# Patient Record
Sex: FEMALE | Race: WHITE | HISPANIC OR LATINO | Employment: UNEMPLOYED | URBAN - METROPOLITAN AREA
[De-identification: names, ages, dates, MRNs, and addresses within clinical notes are randomized per-mention and may not be internally consistent; named-entity substitution may affect disease eponyms.]

---

## 2018-05-01 ENCOUNTER — APPOINTMENT (OUTPATIENT)
Dept: RADIOLOGY | Facility: MEDICAL CENTER | Age: 53
End: 2018-05-01
Attending: EMERGENCY MEDICINE
Payer: MEDICAID

## 2018-05-01 ENCOUNTER — HOSPITAL ENCOUNTER (EMERGENCY)
Facility: MEDICAL CENTER | Age: 53
End: 2018-05-01
Attending: EMERGENCY MEDICINE
Payer: MEDICAID

## 2018-05-01 VITALS
HEIGHT: 60 IN | RESPIRATION RATE: 16 BRPM | TEMPERATURE: 99.6 F | HEART RATE: 80 BPM | BODY MASS INDEX: 31.12 KG/M2 | OXYGEN SATURATION: 94 % | SYSTOLIC BLOOD PRESSURE: 153 MMHG | DIASTOLIC BLOOD PRESSURE: 77 MMHG | WEIGHT: 158.51 LBS

## 2018-05-01 DIAGNOSIS — J10.1 INFLUENZA B: ICD-10-CM

## 2018-05-01 LAB
FLUAV RNA SPEC QL NAA+PROBE: NEGATIVE
FLUBV RNA SPEC QL NAA+PROBE: POSITIVE

## 2018-05-01 PROCEDURE — 700102 HCHG RX REV CODE 250 W/ 637 OVERRIDE(OP): Performed by: EMERGENCY MEDICINE

## 2018-05-01 PROCEDURE — 70450 CT HEAD/BRAIN W/O DYE: CPT

## 2018-05-01 PROCEDURE — 87502 INFLUENZA DNA AMP PROBE: CPT

## 2018-05-01 PROCEDURE — A9270 NON-COVERED ITEM OR SERVICE: HCPCS | Performed by: EMERGENCY MEDICINE

## 2018-05-01 PROCEDURE — 99283 EMERGENCY DEPT VISIT LOW MDM: CPT

## 2018-05-01 RX ORDER — HYDROCODONE BITARTRATE AND ACETAMINOPHEN 5; 325 MG/1; MG/1
2 TABLET ORAL ONCE
Status: COMPLETED | OUTPATIENT
Start: 2018-05-01 | End: 2018-05-01

## 2018-05-01 RX ADMIN — HYDROCODONE BITARTRATE AND ACETAMINOPHEN 2 TABLET: 5; 325 TABLET ORAL at 13:38

## 2018-05-01 ASSESSMENT — PAIN SCALES - GENERAL: PAINLEVEL_OUTOF10: 8

## 2018-05-01 NOTE — DISCHARGE INSTRUCTIONS
Gripe en el adulto  (Influenza, Adult)    Jomar ibuprofen 600-800 mg cada 6 horas 2-3 rodriguez y la medicina de la receta contra dolor y tos.  Descansa, jomar muchos liquidos y no trabaja o vaya a la escuela hasta no tiene fiebre 24 horas (sin ayuda de ibuprofen y tylenol).  Regresa para dificultad de respirar o si parece enfermo.  Vaya a [facility] si no mejora despues de 6 rodriguez enfermedad.    Posiblamiente usted tiene yamilet presion de asif.  Vaya a un medico propio para chequear la presion y el nivel de cholesterol.  BP Readings from Last 3 Encounters:   05/01/18 153/77         Le quezada diagnosticado gripe. La gripe es yue infección viral del tracto respiratorio. Causa escalofríos, fiebre, tos seca, cefaleas, carlitos corporales y dolor de garganta. La gripe lo hará sentir más enfermo que cuando tiene un resfrío común. El peor malestar de la enfermedad dura unos pocos días. La tos y el cansancio pueden durar otros 7 a 10 días. La gripe es muy contagiosa. Se disemina fácilmente a través de las gotas de la tos y el estornudo. Leobardo se trata de un virus, no responderá adecuadamente a los antibióticos (medicamentos que destruyen a las bacterias o gérmenes).    INSTRUCCIONES para el cuidado domiciliario  Ø NO ADMINISTRE ASPIRINA A MENORES DE 18 AÑOS QUE PADEZCAN GRIPE. Puede provocar un daño en el cerebro y en el hígado si produce el síndrome de Reye. Fabienne las etiquetas de los medicamentos de venta sin receta antes de utilizarlos.  Ø Puede adriano Tylenol® o Advil (Motrin)® según lo necesite para los carlitos, el malestar y la temperatura elevada.  Ø Utilice un humidificador de edda fría para aumentar la humedad del ambiente.  Raysal le permitirá respirar mejor.   Ø Sylwia reposo hasta que la temperatura sea normal: 98.6° F (37.0° C). Generalmente esto lleva entre 3 y 4 días. Descanse lo suficiente.  Ø Sharon al menos 8 vasos de 250 cc de líquido por día, leobardo agua, jugo, caldo, gelatina o limonada.   Ø Lávese las trudi con frecuencia  para evitar la diseminación del virus. Greasy es especialmente importante después de sonarse la nariz y antes de tocar los alimentos.     SOLICITE ATENCIÓN MÉDICA SI:  Ø La temperatura oral se eleva sin motivo por encima de 102°F (38.9°C) o según le indique el profesional que lo asiste.  Ø Si la fiebre dura más de 3 días.  Ø Presenta dificultad respiratoria patel el reposo.  Ø Tiene mucha tos con producción de moco o siente dolor en el pecho.  Ø Si tiene náuseas (ganas de vomitar), vómitos o diarrea.    SOLICITE ATENCIÓN MÉDICA DE INMEDIATO SI:  Ø Si tiene dificultad para respirar, le falta la respiración o la piel o las uñas se tornan azulados.  Ø Presenta dolor o rigidez en el ludivina.   Ø Comienza a sentir un dolor de laura intenso, dolor en el albina o en los oídos.  Ø Aparece fiebre elevada, que no puede controlar con medicamentos, o que dura más de 3 días.  Ø Presenta náuseas o vómitos que no puede controlar.    Document Released: 09/27/2006  Document Re-Released: 06/18/2007  [employer]® Patient Information ©2008 [employer].

## 2018-05-01 NOTE — ED PROVIDER NOTES
"ED Provider Note    CHIEF COMPLAINT  Chief Complaint   Patient presents with   • Headache     pt reports \"not feeling well\" since Sunday.  Pt reports chills, hot flashes, headache, and body aches.  Pt denies fever, runny nose.   • Cough   • Chills       HPI  Martha Reyes-Martinez is a 53 y.o. female who presents for moderately severe headache onset yesterday. She also has bilateral thigh and leg pain seems to be more muscles and joints. Her headache is bitemporal and is severe as 8 of 10. It is constant. She had subjective fever. No photophobia. Rare headache and no history of migraine. No family history of aneurysm. She has mild cough and generalized but no focal weakness.    REVIEW OF SYSTEMS  Pertinent positives include: Nausea, headache.  Pertinent negatives include: No matting, photophobia, rhinorrhea, sore throat, diarrhea, rash, diabetes.  10+ systems reviewed and negative.      PAST MEDICAL HISTORY  Hypertension    FAMILY HISTORY  No aneurysm history    SOCIAL HISTORY  Here with daughter      CURRENT MEDICATIONS  Hydrochlorothiazide    ALLERGIES  No Known Allergies    PHYSICAL EXAM  VITAL SIGNS: /77   Pulse 80   Temp 37.6 °C (99.6 °F)   Resp 16   Ht 1.524 m (5')   Wt 71.9 kg (158 lb 8.2 oz)   SpO2 94%   BMI 30.96 kg/m²   Reviewed and hypertensive  Constitutional: Well developed, Well nourished, well-appearing.  HENT: Normocephalic, atraumatic, bilateral external ears normal, oropharynx moist, No exudates or erythema. TMJ crepitus bilaterally. No tooth tenderness. Face symmetric.  Eyes: PERRLA, conjunctiva pink, no scleral icterus.   Cardiovascular: Regular S1-S2 without murmur, rub, gallop. No Dependent edema  Respiratory: No rales, rhonchi, wheeze.  Gastrointestinal: Soft, nontender, nondistended.  Skin: No erythema, no rash.   Genitourinary:  No costovertebral angle tenderness.   Neurologic: Alert & oriented x 3, Cranial nerves II-XII are intact, Grasp, biceps, extensor hallucis longus, ankle " plantar flexion are 5/5 and symmetric, Finger nose finger and fine motor normal. Sensation is intact to light touch in all 4 limbs.  No focal deficits noted.  Psychiatric: Affect normal, Judgment normal, Mood normal.     DIFFERENTIAL DIAGNOSIS:  Subarachnoid hemorrhage, doubt hypertensive headache, influenza, doubt rhabdomyolysis, doubt polymyositis or other viral syndrome.    RADIOLOGY/PROCEDURES  CT-HEAD W/O   Final Result      No acute intracranial abnormality is identified.          LABORATORY:  Results for orders placed or performed during the hospital encounter of 05/01/18   INFLUENZA A/B BY PCR   Result Value Ref Range    Influenza virus A RNA Negative Negative    Influenza virus B, PCR POSITIVE (A) Negative       INTERVENTIONS:  Medications   HYDROcodone-acetaminophen (NORCO) 5-325 MG per tablet 2 Tab (2 Tabs Oral Given 5/1/18 9186)       COURSE & MEDICAL DECISION MAKING  Well-appearing patient presents with headache but minimal cough and no fever and surprisingly has acute influenza B. There is no evidence of subarachnoid hemorrhage. Patient is low risk for serious infection so Tamiflu is not indicated.    PLAN:    Ibuprofen and Tylenol  Influenza handout handout given  Rest and fluids  Return for ill appearance, shortness of breath    MYCHAL Collier  [address] [place] [id number]  [phone]    Schedule an appointment as soon as possible for a visit  si no mejora 5 rodriguez      CONDITION: Stable.    FINAL IMPRESSION  1. Influenza B          Electronically signed by: Toño Aldridge, 5/1/2018 1:27 PM

## 2018-05-01 NOTE — ED TRIAGE NOTES
"Martha Reyes-Martinez 53 y.o. female ambulatory to triage with family for     Chief Complaint   Patient presents with   • Headache     pt reports \"not feeling well\" since Sunday.  Pt reports chills, hot flashes, headache, and body aches.  Pt denies fever, runny nose.   • Cough   • Chills     Pt Jamaican speaking, requesting daughter to translate and declined offer for  ipad.  Pt in NAD.  /77   Pulse 80   Temp 37.6 °C (99.6 °F)   Resp 16   Ht 1.524 m (5')   Wt 71.9 kg (158 lb 8.2 oz)   SpO2 94%   BMI 30.96 kg/m²     "

## 2018-08-25 ENCOUNTER — HOSPITAL ENCOUNTER (INPATIENT)
Facility: MEDICAL CENTER | Age: 53
LOS: 3 days | DRG: 871 | End: 2018-08-29
Attending: EMERGENCY MEDICINE | Admitting: INTERNAL MEDICINE
Payer: MEDICAID

## 2018-08-25 DIAGNOSIS — N12 PYELONEPHRITIS: ICD-10-CM

## 2018-08-25 DIAGNOSIS — S37.019A PERINEPHRIC HEMATOMA: ICD-10-CM

## 2018-08-25 PROCEDURE — 80053 COMPREHEN METABOLIC PANEL: CPT

## 2018-08-25 PROCEDURE — 85007 BL SMEAR W/DIFF WBC COUNT: CPT

## 2018-08-25 PROCEDURE — 85610 PROTHROMBIN TIME: CPT

## 2018-08-25 PROCEDURE — 36415 COLL VENOUS BLD VENIPUNCTURE: CPT

## 2018-08-25 PROCEDURE — 83690 ASSAY OF LIPASE: CPT

## 2018-08-25 PROCEDURE — 85027 COMPLETE CBC AUTOMATED: CPT

## 2018-08-25 PROCEDURE — 99285 EMERGENCY DEPT VISIT HI MDM: CPT

## 2018-08-25 RX ORDER — ONDANSETRON 2 MG/ML
4 INJECTION INTRAMUSCULAR; INTRAVENOUS ONCE
Status: COMPLETED | OUTPATIENT
Start: 2018-08-26 | End: 2018-08-26

## 2018-08-25 RX ORDER — KETOROLAC TROMETHAMINE 30 MG/ML
15 INJECTION, SOLUTION INTRAMUSCULAR; INTRAVENOUS ONCE
Status: COMPLETED | OUTPATIENT
Start: 2018-08-26 | End: 2018-08-26

## 2018-08-25 ASSESSMENT — PAIN SCALES - GENERAL: PAINLEVEL_OUTOF10: 1

## 2018-08-26 ENCOUNTER — APPOINTMENT (OUTPATIENT)
Dept: RADIOLOGY | Facility: MEDICAL CENTER | Age: 53
DRG: 871 | End: 2018-08-26
Attending: EMERGENCY MEDICINE
Payer: MEDICAID

## 2018-08-26 ENCOUNTER — APPOINTMENT (OUTPATIENT)
Dept: RADIOLOGY | Facility: MEDICAL CENTER | Age: 53
DRG: 871 | End: 2018-08-26
Attending: NURSE PRACTITIONER
Payer: MEDICAID

## 2018-08-26 PROBLEM — D64.9 ANEMIA: Status: ACTIVE | Noted: 2018-08-26

## 2018-08-26 PROBLEM — K68.3 RETROPERITONEAL HEMATOMA: Status: ACTIVE | Noted: 2018-08-26

## 2018-08-26 PROBLEM — I10 HYPERTENSION: Status: ACTIVE | Noted: 2018-08-26

## 2018-08-26 PROBLEM — N12 PYELONEPHRITIS: Status: ACTIVE | Noted: 2018-08-26

## 2018-08-26 PROBLEM — A41.9 SEPSIS (HCC): Status: ACTIVE | Noted: 2018-08-26

## 2018-08-26 LAB
ABO GROUP BLD: NORMAL
ABO GROUP BLD: NORMAL
ALBUMIN SERPL BCP-MCNC: 3.1 G/DL (ref 3.2–4.9)
ALBUMIN/GLOB SERPL: 1 G/DL
ALP SERPL-CCNC: 71 U/L (ref 30–99)
ALT SERPL-CCNC: 34 U/L (ref 2–50)
ANION GAP SERPL CALC-SCNC: 14 MMOL/L (ref 0–11.9)
ANISOCYTOSIS BLD QL SMEAR: ABNORMAL
APPEARANCE UR: ABNORMAL
APTT PPP: 37.9 SEC (ref 24.7–36)
AST SERPL-CCNC: 33 U/L (ref 12–45)
BACTERIA #/AREA URNS HPF: ABNORMAL /HPF
BASOPHILS # BLD AUTO: 0 % (ref 0–1.8)
BASOPHILS # BLD: 0 K/UL (ref 0–0.12)
BILIRUB SERPL-MCNC: 0.3 MG/DL (ref 0.1–1.5)
BILIRUB UR QL STRIP.AUTO: NEGATIVE
BLD GP AB SCN SERPL QL: NORMAL
BUN SERPL-MCNC: 19 MG/DL (ref 8–22)
CALCIUM SERPL-MCNC: 8.7 MG/DL (ref 8.5–10.5)
CHLORIDE SERPL-SCNC: 110 MMOL/L (ref 96–112)
CO2 SERPL-SCNC: 21 MMOL/L (ref 20–33)
COLOR UR: YELLOW
CREAT SERPL-MCNC: 1.09 MG/DL (ref 0.5–1.4)
EOSINOPHIL # BLD AUTO: 0.12 K/UL (ref 0–0.51)
EOSINOPHIL NFR BLD: 0.9 % (ref 0–6.9)
ERYTHROCYTE [DISTWIDTH] IN BLOOD BY AUTOMATED COUNT: 41.2 FL (ref 35.9–50)
ERYTHROCYTE [DISTWIDTH] IN BLOOD BY AUTOMATED COUNT: 41.4 FL (ref 35.9–50)
GLOBULIN SER CALC-MCNC: 3.1 G/DL (ref 1.9–3.5)
GLUCOSE SERPL-MCNC: 200 MG/DL (ref 65–99)
GLUCOSE UR STRIP.AUTO-MCNC: NEGATIVE MG/DL
HCT VFR BLD AUTO: 24 % (ref 37–47)
HCT VFR BLD AUTO: 26 % (ref 37–47)
HCT VFR BLD AUTO: 27 % (ref 37–47)
HCT VFR BLD AUTO: 31.3 % (ref 37–47)
HGB BLD-MCNC: 10.5 G/DL (ref 12–16)
HGB BLD-MCNC: 7.8 G/DL (ref 12–16)
HGB BLD-MCNC: 8.4 G/DL (ref 12–16)
HGB BLD-MCNC: 8.8 G/DL (ref 12–16)
INR PPP: 1.16 (ref 0.87–1.13)
KETONES UR STRIP.AUTO-MCNC: NEGATIVE MG/DL
LACTATE BLD-SCNC: 1.9 MMOL/L (ref 0.5–2)
LACTATE BLD-SCNC: 2.6 MMOL/L (ref 0.5–2)
LACTATE BLD-SCNC: 3.5 MMOL/L (ref 0.5–2)
LEUKOCYTE ESTERASE UR QL STRIP.AUTO: ABNORMAL
LIPASE SERPL-CCNC: 35 U/L (ref 11–82)
LYMPHOCYTES # BLD AUTO: 6.42 K/UL (ref 1–4.8)
LYMPHOCYTES NFR BLD: 46.5 % (ref 22–41)
MANUAL DIFF BLD: NORMAL
MCH RBC QN AUTO: 28.5 PG (ref 27–33)
MCH RBC QN AUTO: 29.9 PG (ref 27–33)
MCHC RBC AUTO-ENTMCNC: 32.6 G/DL (ref 33.6–35)
MCHC RBC AUTO-ENTMCNC: 33.5 G/DL (ref 33.6–35)
MCV RBC AUTO: 87.4 FL (ref 81.4–97.8)
MCV RBC AUTO: 89.2 FL (ref 81.4–97.8)
METAMYELOCYTES NFR BLD MANUAL: 0.9 %
MICRO URNS: ABNORMAL
MICROCYTES BLD QL SMEAR: ABNORMAL
MONOCYTES # BLD AUTO: 0.36 K/UL (ref 0–0.85)
MONOCYTES NFR BLD AUTO: 2.6 % (ref 0–13.4)
MORPHOLOGY BLD-IMP: NORMAL
NEUTROPHILS # BLD AUTO: 6.78 K/UL (ref 2–7.15)
NEUTROPHILS NFR BLD: 49.1 % (ref 44–72)
NITRITE UR QL STRIP.AUTO: NEGATIVE
NRBC # BLD AUTO: 0 K/UL
NRBC BLD-RTO: 0 /100 WBC
PH UR STRIP.AUTO: 6.5 [PH]
PLATELET # BLD AUTO: 186 K/UL (ref 164–446)
PLATELET # BLD AUTO: 257 K/UL (ref 164–446)
PLATELET BLD QL SMEAR: NORMAL
PMV BLD AUTO: 10.4 FL (ref 9–12.9)
PMV BLD AUTO: 10.5 FL (ref 9–12.9)
POTASSIUM SERPL-SCNC: 3 MMOL/L (ref 3.6–5.5)
PROT SERPL-MCNC: 6.2 G/DL (ref 6–8.2)
PROT UR QL STRIP: 100 MG/DL
PROTHROMBIN TIME: 14.5 SEC (ref 12–14.6)
RBC # BLD AUTO: 3.09 M/UL (ref 4.2–5.4)
RBC # BLD AUTO: 3.51 M/UL (ref 4.2–5.4)
RBC # URNS HPF: ABNORMAL /HPF
RBC BLD AUTO: PRESENT
RBC UR QL AUTO: ABNORMAL
RENAL EPI CELLS #/AREA URNS HPF: ABNORMAL /HPF
RH BLD: NORMAL
RH BLD: NORMAL
SODIUM SERPL-SCNC: 145 MMOL/L (ref 135–145)
SP GR UR STRIP.AUTO: 1.01
UROBILINOGEN UR STRIP.AUTO-MCNC: 0.2 MG/DL
WBC # BLD AUTO: 10.8 K/UL (ref 4.8–10.8)
WBC # BLD AUTO: 13.8 K/UL (ref 4.8–10.8)
WBC #/AREA URNS HPF: ABNORMAL /HPF

## 2018-08-26 PROCEDURE — 86900 BLOOD TYPING SEROLOGIC ABO: CPT

## 2018-08-26 PROCEDURE — 85027 COMPLETE CBC AUTOMATED: CPT

## 2018-08-26 PROCEDURE — 86850 RBC ANTIBODY SCREEN: CPT

## 2018-08-26 PROCEDURE — 87040 BLOOD CULTURE FOR BACTERIA: CPT | Mod: 91

## 2018-08-26 PROCEDURE — A9270 NON-COVERED ITEM OR SERVICE: HCPCS | Performed by: EMERGENCY MEDICINE

## 2018-08-26 PROCEDURE — 85018 HEMOGLOBIN: CPT

## 2018-08-26 PROCEDURE — 74178 CT ABD&PLV WO CNTR FLWD CNTR: CPT

## 2018-08-26 PROCEDURE — 87086 URINE CULTURE/COLONY COUNT: CPT

## 2018-08-26 PROCEDURE — 86901 BLOOD TYPING SEROLOGIC RH(D): CPT

## 2018-08-26 PROCEDURE — 96375 TX/PRO/DX INJ NEW DRUG ADDON: CPT

## 2018-08-26 PROCEDURE — 700105 HCHG RX REV CODE 258: Performed by: HOSPITALIST

## 2018-08-26 PROCEDURE — 700111 HCHG RX REV CODE 636 W/ 250 OVERRIDE (IP): Performed by: EMERGENCY MEDICINE

## 2018-08-26 PROCEDURE — 36415 COLL VENOUS BLD VENIPUNCTURE: CPT

## 2018-08-26 PROCEDURE — 700105 HCHG RX REV CODE 258: Performed by: INTERNAL MEDICINE

## 2018-08-26 PROCEDURE — 700117 HCHG RX CONTRAST REV CODE 255: Performed by: HOSPITALIST

## 2018-08-26 PROCEDURE — 99223 1ST HOSP IP/OBS HIGH 75: CPT | Performed by: INTERNAL MEDICINE

## 2018-08-26 PROCEDURE — 770020 HCHG ROOM/CARE - TELE (206)

## 2018-08-26 PROCEDURE — 700102 HCHG RX REV CODE 250 W/ 637 OVERRIDE(OP): Performed by: INTERNAL MEDICINE

## 2018-08-26 PROCEDURE — 74176 CT ABD & PELVIS W/O CONTRAST: CPT

## 2018-08-26 PROCEDURE — 85730 THROMBOPLASTIN TIME PARTIAL: CPT

## 2018-08-26 PROCEDURE — 85014 HEMATOCRIT: CPT | Mod: 91

## 2018-08-26 PROCEDURE — 87186 SC STD MICRODIL/AGAR DIL: CPT

## 2018-08-26 PROCEDURE — 96365 THER/PROPH/DIAG IV INF INIT: CPT

## 2018-08-26 PROCEDURE — A9270 NON-COVERED ITEM OR SERVICE: HCPCS | Performed by: INTERNAL MEDICINE

## 2018-08-26 PROCEDURE — 81001 URINALYSIS AUTO W/SCOPE: CPT

## 2018-08-26 PROCEDURE — 700105 HCHG RX REV CODE 258: Performed by: EMERGENCY MEDICINE

## 2018-08-26 PROCEDURE — 83605 ASSAY OF LACTIC ACID: CPT

## 2018-08-26 PROCEDURE — 96367 TX/PROPH/DG ADDL SEQ IV INF: CPT

## 2018-08-26 PROCEDURE — 87077 CULTURE AEROBIC IDENTIFY: CPT

## 2018-08-26 PROCEDURE — 700102 HCHG RX REV CODE 250 W/ 637 OVERRIDE(OP): Performed by: EMERGENCY MEDICINE

## 2018-08-26 RX ORDER — ACETAMINOPHEN 325 MG/1
650 TABLET ORAL EVERY 6 HOURS PRN
Status: DISCONTINUED | OUTPATIENT
Start: 2018-08-26 | End: 2018-08-29 | Stop reason: HOSPADM

## 2018-08-26 RX ORDER — MORPHINE SULFATE 4 MG/ML
2 INJECTION, SOLUTION INTRAMUSCULAR; INTRAVENOUS
Status: DISCONTINUED | OUTPATIENT
Start: 2018-08-26 | End: 2018-08-29 | Stop reason: HOSPADM

## 2018-08-26 RX ORDER — CYCLOBENZAPRINE HCL 10 MG
10 TABLET ORAL ONCE
Status: COMPLETED | OUTPATIENT
Start: 2018-08-26 | End: 2018-08-26

## 2018-08-26 RX ORDER — BISACODYL 10 MG
10 SUPPOSITORY, RECTAL RECTAL
Status: DISCONTINUED | OUTPATIENT
Start: 2018-08-26 | End: 2018-08-29 | Stop reason: HOSPADM

## 2018-08-26 RX ORDER — ONDANSETRON 4 MG/1
4 TABLET, ORALLY DISINTEGRATING ORAL EVERY 4 HOURS PRN
Status: DISCONTINUED | OUTPATIENT
Start: 2018-08-26 | End: 2018-08-29 | Stop reason: HOSPADM

## 2018-08-26 RX ORDER — OXYCODONE HYDROCHLORIDE 5 MG/1
2.5 TABLET ORAL
Status: DISCONTINUED | OUTPATIENT
Start: 2018-08-26 | End: 2018-08-29 | Stop reason: HOSPADM

## 2018-08-26 RX ORDER — POLYETHYLENE GLYCOL 3350 17 G/17G
1 POWDER, FOR SOLUTION ORAL
Status: DISCONTINUED | OUTPATIENT
Start: 2018-08-26 | End: 2018-08-29 | Stop reason: HOSPADM

## 2018-08-26 RX ORDER — ONDANSETRON 2 MG/ML
4 INJECTION INTRAMUSCULAR; INTRAVENOUS EVERY 4 HOURS PRN
Status: DISCONTINUED | OUTPATIENT
Start: 2018-08-26 | End: 2018-08-29 | Stop reason: HOSPADM

## 2018-08-26 RX ORDER — POTASSIUM CHLORIDE 7.45 MG/ML
10 INJECTION INTRAVENOUS ONCE
Status: COMPLETED | OUTPATIENT
Start: 2018-08-26 | End: 2018-08-26

## 2018-08-26 RX ORDER — SODIUM CHLORIDE 9 MG/ML
1000 INJECTION, SOLUTION INTRAVENOUS
Status: DISCONTINUED | OUTPATIENT
Start: 2018-08-26 | End: 2018-08-29 | Stop reason: HOSPADM

## 2018-08-26 RX ORDER — OXYCODONE HYDROCHLORIDE 5 MG/1
5 TABLET ORAL
Status: DISCONTINUED | OUTPATIENT
Start: 2018-08-26 | End: 2018-08-29 | Stop reason: HOSPADM

## 2018-08-26 RX ORDER — AMOXICILLIN 250 MG
2 CAPSULE ORAL 2 TIMES DAILY
Status: DISCONTINUED | OUTPATIENT
Start: 2018-08-26 | End: 2018-08-29 | Stop reason: HOSPADM

## 2018-08-26 RX ORDER — SODIUM CHLORIDE 9 MG/ML
INJECTION, SOLUTION INTRAVENOUS CONTINUOUS
Status: DISCONTINUED | OUTPATIENT
Start: 2018-08-26 | End: 2018-08-29 | Stop reason: HOSPADM

## 2018-08-26 RX ORDER — SODIUM CHLORIDE 9 MG/ML
30 INJECTION, SOLUTION INTRAVENOUS ONCE
Status: COMPLETED | OUTPATIENT
Start: 2018-08-26 | End: 2018-08-26

## 2018-08-26 RX ORDER — SODIUM CHLORIDE 9 MG/ML
30 INJECTION, SOLUTION INTRAVENOUS
Status: DISCONTINUED | OUTPATIENT
Start: 2018-08-26 | End: 2018-08-29 | Stop reason: HOSPADM

## 2018-08-26 RX ORDER — SODIUM CHLORIDE 9 MG/ML
INJECTION, SOLUTION INTRAVENOUS CONTINUOUS
Status: DISCONTINUED | OUTPATIENT
Start: 2018-08-26 | End: 2018-08-26

## 2018-08-26 RX ADMIN — IOHEXOL 100 ML: 350 INJECTION, SOLUTION INTRAVENOUS at 13:10

## 2018-08-26 RX ADMIN — POTASSIUM CHLORIDE 10 MEQ: 7.46 INJECTION, SOLUTION INTRAVENOUS at 03:30

## 2018-08-26 RX ADMIN — ONDANSETRON 4 MG: 2 INJECTION INTRAMUSCULAR; INTRAVENOUS at 00:04

## 2018-08-26 RX ADMIN — CYCLOBENZAPRINE HYDROCHLORIDE 10 MG: 10 TABLET, FILM COATED ORAL at 03:00

## 2018-08-26 RX ADMIN — KETOROLAC TROMETHAMINE 15 MG: 30 INJECTION, SOLUTION INTRAMUSCULAR at 00:05

## 2018-08-26 RX ADMIN — CEFTRIAXONE 2 G: 2 INJECTION, POWDER, FOR SOLUTION INTRAMUSCULAR; INTRAVENOUS at 05:30

## 2018-08-26 RX ADMIN — OXYCODONE HYDROCHLORIDE 2.5 MG: 5 TABLET ORAL at 12:32

## 2018-08-26 RX ADMIN — SODIUM CHLORIDE: 9 INJECTION, SOLUTION INTRAVENOUS at 16:00

## 2018-08-26 RX ADMIN — SODIUM CHLORIDE: 9 INJECTION, SOLUTION INTRAVENOUS at 07:49

## 2018-08-26 RX ADMIN — SODIUM CHLORIDE 2040 ML: 9 INJECTION, SOLUTION INTRAVENOUS at 03:00

## 2018-08-26 ASSESSMENT — PATIENT HEALTH QUESTIONNAIRE - PHQ9
SUM OF ALL RESPONSES TO PHQ9 QUESTIONS 1 AND 2: 0
1. LITTLE INTEREST OR PLEASURE IN DOING THINGS: NOT AT ALL
2. FEELING DOWN, DEPRESSED, IRRITABLE, OR HOPELESS: NOT AT ALL

## 2018-08-26 ASSESSMENT — LIFESTYLE VARIABLES
ON A TYPICAL DAY WHEN YOU DRINK ALCOHOL HOW MANY DRINKS DO YOU HAVE: 0
EVER_SMOKED: NEVER
TOTAL SCORE: 0
HAVE YOU EVER FELT YOU SHOULD CUT DOWN ON YOUR DRINKING: NO
HAVE PEOPLE ANNOYED YOU BY CRITICIZING YOUR DRINKING: NO
EVER HAD A DRINK FIRST THING IN THE MORNING TO STEADY YOUR NERVES TO GET RID OF A HANGOVER: NO
AVERAGE NUMBER OF DAYS PER WEEK YOU HAVE A DRINK CONTAINING ALCOHOL: 0
TOTAL SCORE: 0
ALCOHOL_USE: YES
TOTAL SCORE: 0
CONSUMPTION TOTAL: NEGATIVE
HOW MANY TIMES IN THE PAST YEAR HAVE YOU HAD 5 OR MORE DRINKS IN A DAY: 0
EVER FELT BAD OR GUILTY ABOUT YOUR DRINKING: NO

## 2018-08-26 ASSESSMENT — PAIN SCALES - GENERAL
PAINLEVEL_OUTOF10: 2
PAINLEVEL_OUTOF10: 6
PAINLEVEL_OUTOF10: 5
PAINLEVEL_OUTOF10: 4
PAINLEVEL_OUTOF10: 0

## 2018-08-26 ASSESSMENT — ENCOUNTER SYMPTOMS
PALPITATIONS: 0
VOMITING: 1
FEVER: 0
BLURRED VISION: 0
ABDOMINAL PAIN: 1
HEADACHES: 0
COUGH: 0
SORE THROAT: 0
SEIZURES: 0
BLOOD IN STOOL: 0
MYALGIAS: 0
DIARRHEA: 0
FLANK PAIN: 1
SPUTUM PRODUCTION: 0
BACK PAIN: 0
NECK PAIN: 0
DIZZINESS: 0
CHILLS: 1
NAUSEA: 1
DIAPHORESIS: 0
WHEEZING: 0
FOCAL WEAKNESS: 0
SHORTNESS OF BREATH: 0
BRUISES/BLEEDS EASILY: 0

## 2018-08-26 ASSESSMENT — COPD QUESTIONNAIRES
COPD SCREENING SCORE: 1
IN THE PAST 12 MONTHS DO YOU DO LESS THAN YOU USED TO BECAUSE OF YOUR BREATHING PROBLEMS: DISAGREE/UNSURE
DURING THE PAST 4 WEEKS HOW MUCH DID YOU FEEL SHORT OF BREATH: NONE/LITTLE OF THE TIME
HAVE YOU SMOKED AT LEAST 100 CIGARETTES IN YOUR ENTIRE LIFE: NO/DON'T KNOW
DO YOU EVER COUGH UP ANY MUCUS OR PHLEGM?: NO/ONLY WITH OCCASIONAL COLDS OR INFECTIONS

## 2018-08-26 NOTE — ASSESSMENT & PLAN NOTE
Secondary to acute blood loss   Check iron studies, folate, B12 and TSH  -stable at this time  -transfuse for less than 7/21

## 2018-08-26 NOTE — ED TRIAGE NOTES
[facility] from the [facility] c/o back pain. Patient twisted her back in the chair while playing the slots and had right sided back pain radiating to her right foot. Also reports some tingling in her leg. EMS administered 100mcg Fentanyl IVP and 4 Zofran IVP which brought her pain down from a 10/10 to a 1/10. EMS reports patient became hypotensive (60 systolic) and hypoxic (80%) after Fentanyl. Patient was placed on 2L O2 NC, pO2 94% and given a 500cc bolus, 90 systolic. VSS at triage. Patient A&O. Primarily Ukrainian speaking with  and daughter at bedside.

## 2018-08-26 NOTE — PROGRESS NOTES
Pt arrived to unit via gurney from ED at 0700. Pt oriented to room, unit, and plan of care. Tele-monitor placed and monitor room notified. All questions answered at this time. Call light within reach; fall precautions in place.

## 2018-08-26 NOTE — ED PROVIDER NOTES
CHIEF COMPLAINT  Chief Complaint   Patient presents with   • Back Pain       HPI  Martha Reyes-Martinez is a 53 y.o. female who presents with severe right flank pain.  Started earlier today while she was at a casino.  She states that she went to use the restroom and upon standing, had severe right flank pain associated with nausea.  No recent fevers or illness.  No dysuria or hematuria.  No prior history of back pain issues.  Does have a remote history of nephrolithiasis in the past.  States that the pain radiates down her right leg.  No numbness or weakness in her lower extremities.  No midline back pain.  Specifically points to her right flank as a source of pain.    REVIEW OF SYSTEMS  See HPI for further details. All other systems are negative.     PAST MEDICAL HISTORY   Reports a prior history of nephrolithiasis.    SOCIAL HISTORY  Social History     Social History Main Topics   • Smoking status: Not on file   • Smokeless tobacco: Not on file   • Alcohol use Not on file   • Drug use: Unknown   • Sexual activity: Not on file       SURGICAL HISTORY  patient denies any surgical history    CURRENT MEDICATIONS  Home Medications    **Home medications have not yet been reviewed for this encounter**         ALLERGIES  No Known Allergies    PHYSICAL EXAM  VITAL SIGNS: BP (!) 98/47   Pulse 79   Temp 37 °C (98.6 °F)   Resp 16   Wt 68 kg (150 lb)   SpO2 97%   BMI 29.29 kg/m²   Pulse ox interpretation: I interpret this pulse ox as normal.  Constitutional: Alert in no apparent distress.  HENT: No signs of trauma, Bilateral external ears normal, Nose normal.   Eyes: Pupils are equal and reactive, Conjunctiva normal, Non-icteric.   Neck: Normal range of motion, No tenderness, Supple, No stridor.   Cardiovascular: Regular rate and rhythm, no murmurs.   Thorax & Lungs: Normal breath sounds, No respiratory distress, No wheezing, No chest tenderness.   Abdomen: Bowel sounds normal, Soft, No tenderness, No masses, No pulsatile  "masses. No peritoneal signs.  Skin: Warm, Dry, No erythema, No rash.   Back: No bony tenderness, right CVA tenderness.   Extremities: Intact distal pulses, No edema, No tenderness, No cyanosis  Musculoskeletal: Good range of motion in all major joints. No tenderness to palpation or major deformities noted.   Neurologic: Alert , Normal motor function and gait, Normal sensory function, No focal deficits noted.     DIAGNOSTIC STUDIES / PROCEDURES      LABS  Labs Reviewed   URINALYSIS - Abnormal; Notable for the following:        Result Value    Character Turbid (*)     Protein 100 (*)     Leukocyte Esterase Large (*)     Occult Blood Large (*)     All other components within normal limits   CBC WITH DIFFERENTIAL - Abnormal; Notable for the following:     WBC 13.8 (*)     RBC 3.51 (*)     Hemoglobin 10.5 (*)     Hematocrit 31.3 (*)     MCHC 33.5 (*)     Lymphocytes 46.50 (*)     Lymphs (Absolute) 6.42 (*)     All other components within normal limits   COMP METABOLIC PANEL - Abnormal; Notable for the following:     Potassium 3.0 (*)     Anion Gap 14.0 (*)     Glucose 200 (*)     Albumin 3.1 (*)     All other components within normal limits   ESTIMATED GFR - Abnormal; Notable for the following:     GFR If Non  52 (*)     All other components within normal limits   URINE MICROSCOPIC (W/UA) - Abnormal; Notable for the following:     WBC Packed (*)     RBC 20-50 (*)     Bacteria Many (*)     All other components within normal limits   LACTIC ACID - Abnormal; Notable for the following:     Lactic Acid 3.5 (*)     All other components within normal limits   LIPASE   DIFFERENTIAL MANUAL   PERIPHERAL SMEAR REVIEW   PLATELET ESTIMATE   MORPHOLOGY   URINE CULTURE-EXISTING-LESS THAN 48 HOURS    Narrative:     Indication for culture:->Emergency Room Patient   BLOOD CULTURE    Narrative:     Per Hospital Policy: Only change Specimen Src: to \"Line\" if  specified by physician order.   BLOOD CULTURE    Narrative:     " "Per Hospital Policy: Only change Specimen Src: to \"Line\" if  specified by physician order.   PROTHROMBIN TIME    Narrative:     Indicate which anticoagulants the patient is on:->NONE   CBC WITHOUT DIFFERENTIAL   COD (ADULT)   APTT     RADIOLOGY  CT-RENAL COLIC EVALUATION(A/P W/O)   Final Result         1.  Perinephric and large right retroperitoneal hematoma. Source of hemorrhage unknown. Noncontrast technique limits evaluation for active extravasation.   2.  Hepatomegaly and diffuse hepatic steatosis   3.  Cholelithiasis      These findings were discussed with the patient's clinician, ALYSSA SORTO, on 8/26/2018 4:12 AM.            COURSE & MEDICAL DECISION MAKING  Pertinent Labs & Imaging studies reviewed. (See chart for details)  53 y.o. female presenting with sudden onset right flank pain while at a casino this evening.  States that just prior to this event she was in her usual state of health.  Went to use the restroom and upon standing had severe right flank pain.  Associated with nausea and vomiting.  States that the pain radiated down her right leg.  Denies prior back pain issues in the past.  Denies midline back pain.  No numbness or weakness in her lower extremities is states that she had difficulty with ambulating secondary to the pain.  The patient had active emesis upon arrival here in the emergency department.      Initially, there is suspicion for possible musculoskeletal cause of back pain and the patient was treated with Toradol.  Family at bedside notes that the patient does not have a prior history of diabetes though did have a random glucose greater than 200.  Patient is likely diabetic.  No fever or tachycardia.  Did have rather soft blood pressure.  Denies dysuria or hematuria.  No active hemorrhage.  No fevers.    On laboratory evaluation, did have leukocytosis to 13.8.  No significant renal failure.  Had a slightly low potassium at 3.0.  Was given IV potassium supplementation.  No liver enzyme " abnormalities.    Patient had difficulty providing a urine specimen and so cath urinalysis was performed.  Initially, showed large blood.  Given this finding and severe back pain with vomiting, CT without contrast was ordered for further evaluation of possible nephrolithiasis.  Upon further microscopic evaluation however, is found the patient had packed WBCs and large bacteria with large LE and 20-50 RBCs.  After urinalysis was performed, complete sepsis protocol was initiated.  Found to have a lactate of 3.5.  Was given IV fluid hydration for sepsis.  Was also started on IV antibiotics, ceftriaxone.    CT renal study ultimately showed perinephric and large right retroperitoneal hematoma.  Unknown source of hemorrhage.  Noted left-sided staghorn calculus so the patient does not have symptoms on that side.  Coagulation studies along with COD and repeat H&H were ordered at this time.    The patient was reevaluated after IV fluid resuscitation and treatment for urinary tract infection with ceftriaxone and with antiemetic therapy.  Patient reports feeling improved overall.    The patient will be admitted to the hospital service for further treatment and evaluation.    At 4:50 AM, spoke with Dr. Britt from neurology.  Will follow the patient as an inpatient.    FINAL IMPRESSION  1. Pyelonephritis    2. Perinephric hematoma            Electronically signed by: Brett Andrew, 8/25/2018 11:44 PM

## 2018-08-26 NOTE — ASSESSMENT & PLAN NOTE
-continue IV CTX, good clinical response at this time  -staghorn calculus on the L side  -urology following

## 2018-08-26 NOTE — ASSESSMENT & PLAN NOTE
She reports right flank pain, CT without contrast reveals large right retroperitoneal hematoma  -H/H remaining stable for now.   Coagulation panel was normal   Monitor H&H daily, transfuse for hemoglobin less than 7 or if patient becomes unstable  Urology following, ?repeat CTAP in the next few weeks outpatient

## 2018-08-26 NOTE — CONSULTS
UROLOGY Consult Note:    Virginia Sheets  Date & Time note created:    8/26/2018   11:12 AM     Referring MD:      Patient ID:   Name:             Reyes-Martinez , Martha     YOB: 1965  Age:                 53 y.o.  female   MRN:               [id number]                                                             Reason for Consult:      Right flank pain- Large right retroperitoneal hematoma- source unknown and large left staghorn renal calculus with possible obstruction    History of Present Illness:    Pt presented to the ED last night complaining of severe right sided flank pain that radiated down into the right side of her abdomen 8/10 pain. She also states that she hs hd the chills but no fever.  She denies vasquez dysuria, hematuria or urgency.  She denies any constipation or diarhea.  She thinks she has been told that she has some cysts on her kidneys.  ED evaluation- UA- + Leuks, - nitrites, packed WBC's.   CT abdomen without contrast revealed Perinephric and large right retroperitoneal hematoma as well as a large left staghorn renal calculus identified      Review of Systems:      Constitutional: Denies fevers, Denies weight changes  Eyes: Denies changes in vision, no eye pain  Ears/Nose/Throat/Mouth: Denies nasal congestion or sore throat   Cardiovascular: no chest pain, no palpitations   Respiratory: no shortness of breath , Denies cough  Gastrointestinal/Hepatic: + abdominal pain R>L, denies any nausea, vomiting, diarrhea, constipation or GI bleeding   Genitourinary: Denies hematuria, dysuria or frequency, + CVA tenderness on the right  Musculoskeletal/Rheum: Denies  joint pain and swelling, no edema  Skin: Denies rash  Neurological: Denies headache, confusion, memory loss or focal weakness/parasthesias  Psychiatric: denies mood disorder     All other systems were reviewed and are negative (AMA/CMS criteria)                Past Medical History:   Past Medical History:   Diagnosis Date   •  Hypertension    • Nephrolithiasis      Active Hospital Problems    Diagnosis   • Retroperitoneal hematoma [K66.1]     Priority: High   • Sepsis (HCC) [A41.9]     Priority: Medium   • Hypertension [I10]     Priority: Low   • Pyelonephritis [N12]   • Anemia [D64.9]       Past Surgical History:  No past surgical history on file.    Hospital Medications:    Current Facility-Administered Medications:   •  NS infusion 2,040 mL, 30 mL/kg, Intravenous, Once PRN, Hector Beckford M.D.  •  senna-docusate (PERICOLACE or SENOKOT S) 8.6-50 MG per tablet 2 Tab, 2 Tab, Oral, BID **AND** polyethylene glycol/lytes (MIRALAX) PACKET 1 Packet, 1 Packet, Oral, QDAY PRN **AND** magnesium hydroxide (MILK OF MAGNESIA) suspension 30 mL, 30 mL, Oral, QDAY PRN **AND** bisacodyl (DULCOLAX) suppository 10 mg, 10 mg, Rectal, QDAY PRN, Hector Beckford M.D.  •  Respiratory Care per Protocol, , Nebulization, Continuous RT, Hector Beckford M.D.  •  NS infusion, , Intravenous, Continuous, Hector Beckford M.D., Last Rate: 150 mL/hr at 08/26/18 0749  •  NS (BOLUS) infusion 1,000 mL, 1,000 mL, Intravenous, Once PRN, Hector Beckford M.D.  •  acetaminophen (TYLENOL) tablet 650 mg, 650 mg, Oral, Q6HRS PRN, Hector Beckford M.D.  •  Notify provider if pain remains uncontrolled, , , CONTINUOUS **AND** Use the numeric rating scale (NRS-11) on regular floors and Critical-Care Pain Observation Tool (CPOT) on ICUs/Trauma to assess pain, , , CONTINUOUS **AND** Pulse Ox (Oximetry), , , CONTINUOUS **AND** Pharmacy Consult Request ...Pain Management Review, , Other, PRN **AND** If patient difficult to arouse and/or has respiratory depression, stop any opiates that are currently infusing and call a Rapid Response., , , CONTINUOUS **AND** oxyCODONE immediate-release (ROXICODONE) tablet 2.5 mg, 2.5 mg, Oral, Q3HRS PRN **AND** oxyCODONE immediate-release (ROXICODONE) tablet 5 mg, 5 mg, Oral, Q3HRS PRN **AND** morphine (pf) 4 mg/ml injection 2 mg, 2 mg, Intravenous, Q3HRS PRN, Hector Beckford,  M.D.  •  [START ON 8/27/2018] cefTRIAXone (ROCEPHIN) 2 g in  mL IVPB, 2 g, Intravenous, Q24HRS, Hector Beckford M.D.  •  ondansetron (ZOFRAN) syringe/vial injection 4 mg, 4 mg, Intravenous, Q4HRS PRN, Hector Beckford M.D.  •  ondansetron (ZOFRAN ODT) dispertab 4 mg, 4 mg, Oral, Q4HRS PRN, Hector Beckford M.D.    Current Outpatient Medications:  No prescriptions prior to admission.       Medication Allergy:  No Known Allergies    Family History:  No family history on file.    Social History:  Social History     Social History   • Marital status:      Spouse name: N/A   • Number of children: N/A   • Years of education: N/A     Occupational History   • Not on file.     Social History Main Topics   • Smoking status: Not on file   • Smokeless tobacco: Not on file   • Alcohol use Not on file   • Drug use: Unknown   • Sexual activity: Not on file     Other Topics Concern   • Not on file     Social History Narrative   • No narrative on file         Physical Exam:  Vitals/ General Appearance:   Weight/BMI: Body mass index is 29.29 kg/m².  Blood pressure 110/69, pulse 82, temperature 37.2 °C (99 °F), resp. rate 20, weight 68 kg (150 lb), SpO2 93 %.  Vitals:    08/26/18 0530 08/26/18 0600 08/26/18 0701 08/26/18 0806   BP:   114/56 110/69   Pulse: 62 72 63 82   Resp:   20 20   Temp:   37.1 °C (98.7 °F) 37.2 °C (99 °F)   SpO2: 95% 93%  93%   Weight:         Oxygen Therapy:  Pulse Oximetry: 93 %, O2 (LPM): 0, O2 Delivery: None (Room Air)    Constitutional:   Well developed, Well nourished, No acute distress  HENMT:  Normocephalic, Atraumatic, Oropharynx moist mucous membranes, No oral exudates, Nose normal.  No thyromegaly.  Eyes:  EOMI, Conjunctiva normal,   Neck:  Normal range of motion, No cervical tenderness,  no JVD.  Cardiovascular:  Normal heart rate, Normal rhythm, No murmurs, No rubs, No gallops.   Extremitites with intact distal pulses, no cyanosis, or edema.  Lungs:  Normal breath sounds, breath sounds clear to  auscultation bilaterally,  no crackles, no wheezing.   Abdomen: Bowel sounds normal, Soft, + tenderness + guarding  : voiding clear yellow urine  Skin: Warm, Dry, No erythema, No rash, no induration.  Neurologic: Alert & oriented x 3, No focal deficits noted, cranial nerves II through X are grossly intact.  Psychiatric: Affect normal, Judgment normal, Mood normal.      MDM (Data Review):     Records reviewed and summarized in current documentation    Lab Data Review:  Recent Results (from the past 24 hour(s))   CBC WITH DIFFERENTIAL    Collection Time: 08/25/18 11:41 PM   Result Value Ref Range    WBC 13.8 (H) 4.8 - 10.8 K/uL    RBC 3.51 (L) 4.20 - 5.40 M/uL    Hemoglobin 10.5 (L) 12.0 - 16.0 g/dL    Hematocrit 31.3 (L) 37.0 - 47.0 %    MCV 89.2 81.4 - 97.8 fL    MCH 29.9 27.0 - 33.0 pg    MCHC 33.5 (L) 33.6 - 35.0 g/dL    RDW 41.4 35.9 - 50.0 fL    Platelet Count 257 164 - 446 K/uL    MPV 10.5 9.0 - 12.9 fL    Nucleated RBC 0.00 /100 WBC    NRBC (Absolute) 0.00 K/uL    Neutrophils-Polys 49.10 44.00 - 72.00 %    Lymphocytes 46.50 (H) 22.00 - 41.00 %    Monocytes 2.60 0.00 - 13.40 %    Eosinophils 0.90 0.00 - 6.90 %    Basophils 0.00 0.00 - 1.80 %    Neutrophils (Absolute) 6.78 2.00 - 7.15 K/uL    Lymphs (Absolute) 6.42 (H) 1.00 - 4.80 K/uL    Monos (Absolute) 0.36 0.00 - 0.85 K/uL    Eos (Absolute) 0.12 0.00 - 0.51 K/uL    Baso (Absolute) 0.00 0.00 - 0.12 K/uL    Anisocytosis 1+     Microcytosis 1+    COMP METABOLIC PANEL    Collection Time: 08/25/18 11:41 PM   Result Value Ref Range    Sodium 145 135 - 145 mmol/L    Potassium 3.0 (L) 3.6 - 5.5 mmol/L    Chloride 110 96 - 112 mmol/L    Co2 21 20 - 33 mmol/L    Anion Gap 14.0 (H) 0.0 - 11.9    Glucose 200 (H) 65 - 99 mg/dL    Bun 19 8 - 22 mg/dL    Creatinine 1.09 0.50 - 1.40 mg/dL    Calcium 8.7 8.5 - 10.5 mg/dL    AST(SGOT) 33 12 - 45 U/L    ALT(SGPT) 34 2 - 50 U/L    Alkaline Phosphatase 71 30 - 99 U/L    Total Bilirubin 0.3 0.1 - 1.5 mg/dL    Albumin 3.1 (L)  3.2 - 4.9 g/dL    Total Protein 6.2 6.0 - 8.2 g/dL    Globulin 3.1 1.9 - 3.5 g/dL    A-G Ratio 1.0 g/dL   LIPASE    Collection Time: 08/25/18 11:41 PM   Result Value Ref Range    Lipase 35 11 - 82 U/L   ESTIMATED GFR    Collection Time: 08/25/18 11:41 PM   Result Value Ref Range    GFR If African American >60 >60 mL/min/1.73 m 2    GFR If Non African American 52 (A) >60 mL/min/1.73 m 2   DIFFERENTIAL MANUAL    Collection Time: 08/25/18 11:41 PM   Result Value Ref Range    Metamyelocytes 0.90 %    Manual Diff Status PERFORMED    PERIPHERAL SMEAR REVIEW    Collection Time: 08/25/18 11:41 PM   Result Value Ref Range    Peripheral Smear Review see below    PLATELET ESTIMATE    Collection Time: 08/25/18 11:41 PM   Result Value Ref Range    Plt Estimation Normal    MORPHOLOGY    Collection Time: 08/25/18 11:41 PM   Result Value Ref Range    RBC Morphology Present    COD (ADULT)    Collection Time: 08/25/18 11:41 PM   Result Value Ref Range    ABO Grouping Only O     Rh Grouping Only POS     Antibody Screen-Cod NEG    PT/INR    Collection Time: 08/25/18 11:41 PM   Result Value Ref Range    PT 14.5 12.0 - 14.6 sec    INR 1.16 (H) 0.87 - 1.13   URINALYSIS    Collection Time: 08/26/18  1:45 AM   Result Value Ref Range    Color Yellow     Character Turbid (A)     Specific Gravity 1.013 <1.035    Ph 6.5 5.0 - 8.0    Glucose Negative Negative mg/dL    Ketones Negative Negative mg/dL    Protein 100 (A) Negative mg/dL    Bilirubin Negative Negative    Urobilinogen, Urine 0.2 Negative    Nitrite Negative Negative    Leukocyte Esterase Large (A) Negative    Occult Blood Large (A) Negative    Micro Urine Req Microscopic    URINE MICROSCOPIC (W/UA)    Collection Time: 08/26/18  1:45 AM   Result Value Ref Range    WBC Packed (A) /hpf    RBC 20-50 (A) /hpf    Bacteria Many (A) None /hpf    Epithelial Cells Renal Few /hpf   LACTIC ACID    Collection Time: 08/26/18  3:41 AM   Result Value Ref Range    Lactic Acid 3.5 (H) 0.5 - 2.0 mmol/L    CBC WITHOUT DIFFERENTIAL    Collection Time: 08/26/18  5:24 AM   Result Value Ref Range    WBC 10.8 4.8 - 10.8 K/uL    RBC 3.09 (L) 4.20 - 5.40 M/uL    Hemoglobin 8.8 (L) 12.0 - 16.0 g/dL    Hematocrit 27.0 (L) 37.0 - 47.0 %    MCV 87.4 81.4 - 97.8 fL    MCH 28.5 27.0 - 33.0 pg    MCHC 32.6 (L) 33.6 - 35.0 g/dL    RDW 41.2 35.9 - 50.0 fL    Platelet Count 186 164 - 446 K/uL    MPV 10.4 9.0 - 12.9 fL   APTT    Collection Time: 08/26/18  5:24 AM   Result Value Ref Range    APTT 37.9 (H) 24.7 - 36.0 sec   ABO AND RH CONFIRMATION    Collection Time: 08/26/18  5:24 AM   Result Value Ref Range    ABO Confirm O     Second Rh Group POS    HEMOGLOBIN AND HEMATOCRIT    Collection Time: 08/26/18  9:45 AM   Result Value Ref Range    Hemoglobin 8.4 (L) 12.0 - 16.0 g/dL    Hematocrit 26.0 (L) 37.0 - 47.0 %   Lactic Acid Every four hours after STAT order    Collection Time: 08/26/18  9:45 AM   Result Value Ref Range    Lactic Acid 2.6 (H) 0.5 - 2.0 mmol/L       Imaging/Procedures Review:    Reviewed    MDM (Assessment and Plan):     Active Hospital Problems    Diagnosis   • Retroperitoneal hematoma [K66.1]     Priority: High   • Sepsis (HCC) [A41.9]     Priority: Medium   • Hypertension [I10]     Priority: Low   • Pyelonephritis [N12]   • Anemia [D64.9]         We will order a CT urogram today to further evaluate retroperitoneal hematoma-  Follow creatinine, and pt symptoms.   Daily H/H  Bed rest  Can start clear liquids and careful to advance due to high chance for ileus from bleeding  Continue ABX  Avoid NSAIDS and anticoagulants  Re-evaluate tomorrow  No immediate need for stone treatment    Plan of care directed by MD Britt.

## 2018-08-26 NOTE — ASSESSMENT & PLAN NOTE
This is sepsis (without associated acute organ dysfunction).   Likely source is pyelonephritis  Continue IV fluids and IV antibiotic  -LA normal  -Pan sensitive E coli, continue above medications

## 2018-08-26 NOTE — H&P
Hospital Medicine History & Physical Note    Date of Service  8/26/2018    Primary Care Physician  BECKY Collier.    Consultants  Urology Dr. Britt    Code Status  Full code    Chief Complaint  Right flank pain    History of Presenting Illness  53 y.o. female with a past medical history of hypertension who presented 8/25/2018 with right flank pain that started yesterday.  Patient reports developing severe right flank pain with radiation down to her lower abdomen, rated at 8/10 intensity with no relieving or exacerbating factors.  She also endorses nausea and vomiting, denies any blood in the vomit.  She also reports chills but no fevers.  She denies any dysuria or urinary urgency.  Patient denies any diarrhea or blood in the stool.  She denies any headaches, neck stiffness, chest pain, shortness of breath or cough.  She is not on any blood thinners.  She reports a history of cysts but is unsure if it was in her kidneys.    In the ER she was found to be hypotensive.  Hemoglobin 10.5, WBC is 13.8, lactate 3.5.  UA was packed with WBCs.  CT abdomen without contrast revealed Perinephric and large right retroperitoneal hematoma. Source of hemorrhage unknown. Noncontrast technique limits evaluation for active extravasation.    Case was discussed with Dr. Britt from neurology who will consult on the patient.      Review of Systems  Review of Systems   Constitutional: Positive for chills and malaise/fatigue. Negative for diaphoresis and fever.   HENT: Negative for hearing loss and sore throat.    Eyes: Negative for blurred vision.   Respiratory: Negative for cough, sputum production, shortness of breath and wheezing.    Cardiovascular: Negative for chest pain, palpitations and leg swelling.   Gastrointestinal: Positive for abdominal pain, nausea and vomiting. Negative for blood in stool, diarrhea and melena.   Genitourinary: Positive for flank pain (Right). Negative for dysuria and urgency.   Musculoskeletal:  Negative for back pain, joint pain, myalgias and neck pain.   Skin: Negative for rash.   Neurological: Negative for dizziness, focal weakness, seizures and headaches.   Endo/Heme/Allergies: Does not bruise/bleed easily.   Psychiatric/Behavioral: Negative for suicidal ideas.   All other systems reviewed and are negative.      Past Medical History   has no past medical history of Breast cancer.    Surgical History  Hysterectomy    Family History  History reviewed.  No pertinent family history    Social History   Denies smoking.  Drinks alcohol occasionally    Allergies  No Known Allergies    Medications  None       Physical Exam  Blood Pressure: (!) 98/47   Temperature: 37 °C (98.6 °F)   Pulse: 68   Respiration: 16   Pulse Oximetry: 97 %     Physical Exam   Constitutional: She is oriented to person, place, and time. She appears well-developed and well-nourished. No distress.   Obese   HENT:   Head: Normocephalic and atraumatic.   Mouth/Throat: Oropharynx is clear and moist.   Eyes: Pupils are equal, round, and reactive to light.   Conjunctival pallor   Neck: Neck supple. No thyromegaly present.   Cardiovascular: Normal rate, regular rhythm and normal heart sounds.    Pulmonary/Chest: Effort normal and breath sounds normal. No respiratory distress. She has no wheezes. She has no rales.   Abdominal: Soft. Bowel sounds are normal. She exhibits no distension. There is tenderness (Lower abdominal). There is no rebound and no guarding.   Genitourinary:   Genitourinary Comments: Right CVA tenderness   Musculoskeletal: Normal range of motion. She exhibits no edema or tenderness.   Neurological: She is alert and oriented to person, place, and time. No cranial nerve deficit. Coordination normal.   Skin: Skin is warm and dry.   Psychiatric: She has a normal mood and affect. Her behavior is normal.   Nursing note and vitals reviewed.      Laboratory:  Recent Labs      08/25/18   2341  08/26/18   0524   WBC  13.8*  10.8   RBC   3.51*  3.09*   HEMOGLOBIN  10.5*  8.8*   HEMATOCRIT  31.3*  27.0*   MCV  89.2  87.4   MCH  29.9  28.5   MCHC  33.5*  32.6*   RDW  41.4  41.2   PLATELETCT  257  186   MPV  10.5  10.4     Recent Labs      08/25/18   2341   SODIUM  145   POTASSIUM  3.0*   CHLORIDE  110   CO2  21   GLUCOSE  200*   BUN  19   CREATININE  1.09   CALCIUM  8.7     Recent Labs      08/25/18   2341   ALTSGPT  34   ASTSGOT  33   ALKPHOSPHAT  71   TBILIRUBIN  0.3   LIPASE  35   GLUCOSE  200*     Recent Labs      08/25/18   2341   INR  1.16*             No results found for: TROPONINI    Urinalysis:    Recent Labs      08/26/18   0145   SPECGRAVITY  1.013   GLUCOSEUR  Negative   KETONES  Negative   NITRITE  Negative   LEUKESTERAS  Large*   WBCURINE  Packed*   RBCURINE  20-50*   BACTERIA  Many*        Imaging:  CT-RENAL COLIC EVALUATION(A/P W/O)   Final Result         1.  Perinephric and large right retroperitoneal hematoma. Source of hemorrhage unknown. Noncontrast technique limits evaluation for active extravasation.   2.  Hepatomegaly and diffuse hepatic steatosis   3.  Cholelithiasis      These findings were discussed with the patient's clinician, ALYSSA SORTO, on 8/26/2018 4:12 AM.            Assessment/Plan:  I anticipate this patient will require at least two midnights for appropriate medical management, necessitating inpatient admission.    Retroperitoneal hematoma- (present on admission)   Assessment & Plan    She reports right flank pain, CT without contrast reveals large right retroperitoneal hematoma  Hemoglobin down from 10.5->8.1  Coagulation panel was normal in  COD  Monitor H&H every 8 hours, transfuse for hemoglobin less than 7 or if patient becomes unstable  Repeat CT abdomen with IV contrast to evaluate source and progression  Urology has been consulted  Avoid aspirin or anticoagulation        Sepsis (HCC)- (present on admission)   Assessment & Plan    This is sepsis (without associated acute organ dysfunction).   Likely source is  pyelonephritis  Patient has been started on IV fluids per septic protocol  Lactate is elevated, trend to resolution  Patient is started on IV ceftriaxone  Follow blood and urine cultures          Anemia- (present on admission)   Assessment & Plan    Secondary to blood loss   Check iron studies, folate, B12 and TSH  Monitor H&H every 8 hours, transfuse for hemoglobin less than 7          Pyelonephritis- (present on admission)   Assessment & Plan    Started on IV ceftriaxone  Follow urine cultures        Hypertension   Assessment & Plan    Hold BP meds at this time            VTE prophylaxis: SCD

## 2018-08-27 LAB
ANION GAP SERPL CALC-SCNC: 5 MMOL/L (ref 0–11.9)
BASOPHILS # BLD AUTO: 0.3 % (ref 0–1.8)
BASOPHILS # BLD: 0.02 K/UL (ref 0–0.12)
BUN SERPL-MCNC: 13 MG/DL (ref 8–22)
CALCIUM SERPL-MCNC: 8.6 MG/DL (ref 8.5–10.5)
CHLORIDE SERPL-SCNC: 111 MMOL/L (ref 96–112)
CO2 SERPL-SCNC: 27 MMOL/L (ref 20–33)
CREAT SERPL-MCNC: 0.73 MG/DL (ref 0.5–1.4)
EOSINOPHIL # BLD AUTO: 0.19 K/UL (ref 0–0.51)
EOSINOPHIL NFR BLD: 2.5 % (ref 0–6.9)
ERYTHROCYTE [DISTWIDTH] IN BLOOD BY AUTOMATED COUNT: 42.4 FL (ref 35.9–50)
GLUCOSE SERPL-MCNC: 103 MG/DL (ref 65–99)
HCT VFR BLD AUTO: 23 % (ref 37–47)
HCT VFR BLD AUTO: 24.4 % (ref 37–47)
HGB BLD-MCNC: 7.4 G/DL (ref 12–16)
HGB BLD-MCNC: 8 G/DL (ref 12–16)
IMM GRANULOCYTES # BLD AUTO: 0.04 K/UL (ref 0–0.11)
IMM GRANULOCYTES NFR BLD AUTO: 0.5 % (ref 0–0.9)
LYMPHOCYTES # BLD AUTO: 2.64 K/UL (ref 1–4.8)
LYMPHOCYTES NFR BLD: 35.2 % (ref 22–41)
MCH RBC QN AUTO: 28.8 PG (ref 27–33)
MCHC RBC AUTO-ENTMCNC: 32.2 G/DL (ref 33.6–35)
MCV RBC AUTO: 89.5 FL (ref 81.4–97.8)
MONOCYTES # BLD AUTO: 0.51 K/UL (ref 0–0.85)
MONOCYTES NFR BLD AUTO: 6.8 % (ref 0–13.4)
NEUTROPHILS # BLD AUTO: 4.11 K/UL (ref 2–7.15)
NEUTROPHILS NFR BLD: 54.7 % (ref 44–72)
NRBC # BLD AUTO: 0 K/UL
NRBC BLD-RTO: 0 /100 WBC
PLATELET # BLD AUTO: 170 K/UL (ref 164–446)
PMV BLD AUTO: 10.1 FL (ref 9–12.9)
POTASSIUM SERPL-SCNC: 3.6 MMOL/L (ref 3.6–5.5)
RBC # BLD AUTO: 2.57 M/UL (ref 4.2–5.4)
SODIUM SERPL-SCNC: 143 MMOL/L (ref 135–145)
WBC # BLD AUTO: 7.5 K/UL (ref 4.8–10.8)

## 2018-08-27 PROCEDURE — 770020 HCHG ROOM/CARE - TELE (206)

## 2018-08-27 PROCEDURE — A9270 NON-COVERED ITEM OR SERVICE: HCPCS | Performed by: INTERNAL MEDICINE

## 2018-08-27 PROCEDURE — 99232 SBSQ HOSP IP/OBS MODERATE 35: CPT | Performed by: HOSPITALIST

## 2018-08-27 PROCEDURE — 80048 BASIC METABOLIC PNL TOTAL CA: CPT

## 2018-08-27 PROCEDURE — 700102 HCHG RX REV CODE 250 W/ 637 OVERRIDE(OP): Performed by: INTERNAL MEDICINE

## 2018-08-27 PROCEDURE — 700105 HCHG RX REV CODE 258: Performed by: HOSPITALIST

## 2018-08-27 PROCEDURE — 700105 HCHG RX REV CODE 258: Performed by: INTERNAL MEDICINE

## 2018-08-27 PROCEDURE — 85018 HEMOGLOBIN: CPT

## 2018-08-27 PROCEDURE — 85014 HEMATOCRIT: CPT

## 2018-08-27 PROCEDURE — 85025 COMPLETE CBC W/AUTO DIFF WBC: CPT

## 2018-08-27 PROCEDURE — 36415 COLL VENOUS BLD VENIPUNCTURE: CPT

## 2018-08-27 PROCEDURE — 700111 HCHG RX REV CODE 636 W/ 250 OVERRIDE (IP): Performed by: INTERNAL MEDICINE

## 2018-08-27 RX ADMIN — CEFTRIAXONE SODIUM 2 G: 2 INJECTION, POWDER, FOR SOLUTION INTRAMUSCULAR; INTRAVENOUS at 05:37

## 2018-08-27 RX ADMIN — OXYCODONE HYDROCHLORIDE 2.5 MG: 5 TABLET ORAL at 06:56

## 2018-08-27 RX ADMIN — POLYETHYLENE GLYCOL 3350 1 PACKET: 17 POWDER, FOR SOLUTION ORAL at 14:29

## 2018-08-27 RX ADMIN — DOCUSATE SODIUM -SENNOSIDES 2 TABLET: 50; 8.6 TABLET, COATED ORAL at 17:16

## 2018-08-27 RX ADMIN — SODIUM CHLORIDE: 9 INJECTION, SOLUTION INTRAVENOUS at 02:42

## 2018-08-27 RX ADMIN — OXYCODONE HYDROCHLORIDE 2.5 MG: 5 TABLET ORAL at 17:21

## 2018-08-27 RX ADMIN — SODIUM CHLORIDE: 9 INJECTION, SOLUTION INTRAVENOUS at 17:25

## 2018-08-27 ASSESSMENT — COGNITIVE AND FUNCTIONAL STATUS - GENERAL
MOBILITY SCORE: 16
SUGGESTED CMS G CODE MODIFIER DAILY ACTIVITY: CK
HELP NEEDED FOR BATHING: A LITTLE
SUGGESTED CMS G CODE MODIFIER MOBILITY: CK
WALKING IN HOSPITAL ROOM: A LITTLE
STANDING UP FROM CHAIR USING ARMS: A LITTLE
CLIMB 3 TO 5 STEPS WITH RAILING: A LITTLE
MOVING FROM LYING ON BACK TO SITTING ON SIDE OF FLAT BED: A LOT
DRESSING REGULAR LOWER BODY CLOTHING: A LITTLE
DAILY ACTIVITIY SCORE: 19
DRESSING REGULAR UPPER BODY CLOTHING: A LITTLE
TURNING FROM BACK TO SIDE WHILE IN FLAT BAD: A LITTLE
TOILETING: A LOT
MOVING TO AND FROM BED TO CHAIR: A LOT

## 2018-08-27 ASSESSMENT — ENCOUNTER SYMPTOMS
HEADACHES: 0
NAUSEA: 0
SHORTNESS OF BREATH: 0
ABDOMINAL PAIN: 1
BLURRED VISION: 0
VOMITING: 0
CHILLS: 1
BACK PAIN: 0
COUGH: 0
DOUBLE VISION: 0
DEPRESSION: 0
NERVOUS/ANXIOUS: 0
MYALGIAS: 0
FEVER: 0
SORE THROAT: 0
DIZZINESS: 0

## 2018-08-27 ASSESSMENT — PAIN SCALES - GENERAL
PAINLEVEL_OUTOF10: 0
PAINLEVEL_OUTOF10: 4
PAINLEVEL_OUTOF10: 10

## 2018-08-27 NOTE — PROGRESS NOTES
Urology Progress Note      Overnight Events: None    S: No fevers, chills, nausea or vomiting.  On bedrest. Labs improving. No source of bleeding seen on CT scan.     O:   Blood pressure 132/74, pulse 85, temperature 37.5 °C (99.5 °F), resp. rate 18, weight 77 kg (169 lb 12.1 oz), SpO2 95 %.  Recent Labs      08/25/18   2341  08/27/18   0054   SODIUM  145  143   POTASSIUM  3.0*  3.6   CHLORIDE  110  111   CO2  21  27   GLUCOSE  200*  103*   BUN  19  13   CREATININE  1.09  0.73   CALCIUM  8.7  8.6     Recent Labs      08/25/18   2341  08/26/18   0524   08/26/18   1650  08/27/18   0054  08/27/18   0854   WBC  13.8*  10.8   --    --   7.5   --    RBC  3.51*  3.09*   --    --   2.57*   --    HEMOGLOBIN  10.5*  8.8*   < >  7.8*  7.4*  8.0*   HEMATOCRIT  31.3*  27.0*   < >  24.0*  23.0*  24.4*   MCV  89.2  87.4   --    --   89.5   --    MCH  29.9  28.5   --    --   28.8   --    MCHC  33.5*  32.6*   --    --   32.2*   --    RDW  41.4  41.2   --    --   42.4   --    PLATELETCT  257  186   --    --   170   --    MPV  10.5  10.4   --    --   10.1   --     < > = values in this interval not displayed.         Intake/Output Summary (Last 24 hours) at 08/27/18 1046  Last data filed at 08/27/18 0839   Gross per 24 hour   Intake              500 ml   Output              870 ml   Net             -370 ml       Exam:  Abdomen soft, benign.    Urine: clear    A/P:    Active Hospital Problems    Diagnosis   • Retroperitoneal hematoma [K66.1]     Priority: High   • Sepsis (HCC) [A41.9]     Priority: Medium   • Hypertension [I10]     Priority: Low   • Pyelonephritis [N12]   • Anemia [D64.9]       Stable.   Continue bedrest  No NSAIDS, anticoagulants  Monitor HCT  Will need OP treatment of left staghorn

## 2018-08-27 NOTE — PROGRESS NOTES
Received report from day shift RN Shan. Assumed care of the patient. Patient is SR on the monitor at this time. Patient is sitting up in bed with family present at bedside at this time. Patient declines any needs at this time. Plan of care discussed with patient. Bed locked and in the lowest position with call light within reach.

## 2018-08-27 NOTE — CARE PLAN
Problem: Safety  Goal: Will remain free from injury  Outcome: PROGRESSING AS EXPECTED  Discussed with the patient and family the importance of calling for assistance prior to getting out of bed in order to help prevent falls since patient has pain in abdomen. Patient accepting of the information. All questions answered at this time.     Problem: Infection  Goal: Will remain free from infection  Outcome: PROGRESSING AS EXPECTED  Discussed with patient and family the importance of washing hands before and after eating or using the restroom in order to help prevent infections. Patient accepting of the information. All questions answered at this time.

## 2018-08-27 NOTE — PROGRESS NOTES
Assumed care of patient, bedside report received from JONATHON Smith. Updated on POC, call light within reach and fall precautions in place. Bed locked and in lowest position. Patient instructed to call for assistance before getting out of bed. All questions answered, no other needs at this time.

## 2018-08-27 NOTE — PROGRESS NOTES
[facility]ist Progress Note    Date of Service: 2018    Chief Complaint  53 y.o. female admitted 2018 with right flank pain.  Found to have a retro-peritoneal hematoma and staghorn calculus    Interval Problem Update  Feels better overall  No issues overnight  Hemoglobin trending upwards  Still with right flank pain but that is improving  No fevers or chills  Wanting to eat    Consultants/Specialty  Urology    Disposition  To be determined        Review of Systems   Constitutional: Positive for chills and malaise/fatigue. Negative for fever.   HENT: Negative for hearing loss and sore throat.    Eyes: Negative for blurred vision and double vision.   Respiratory: Negative for cough and shortness of breath.    Cardiovascular: Negative for chest pain and leg swelling.   Gastrointestinal: Positive for abdominal pain (Right flank). Negative for nausea and vomiting.   Genitourinary: Negative for dysuria and frequency.   Musculoskeletal: Negative for back pain and myalgias.   Skin: Negative for itching and rash.   Neurological: Negative for dizziness and headaches.   Psychiatric/Behavioral: Negative for depression. The patient is not nervous/anxious.       Physical Exam  Laboratory/Imaging   Hemodynamics  Temp (24hrs), Av.1 °C (98.8 °F), Min:36.7 °C (98 °F), Max:37.5 °C (99.5 °F)   Temperature: 37.3 °C (99.2 °F)  Pulse  Av.4  Min: 62  Max: 99    Blood Pressure: 134/84      Respiratory      Respiration: 18, Pulse Oximetry: 95 %, O2 Daily Delivery Respiratory : Room Air with O2 Available     Work Of Breathing / Effort: Mild  RUL Breath Sounds: Clear, RML Breath Sounds: Clear, RLL Breath Sounds: Diminished, BILLY Breath Sounds: Clear, LLL Breath Sounds: Diminished    Fluids    Intake/Output Summary (Last 24 hours) at 18 1317  Last data filed at 18 1200   Gross per 24 hour   Intake              500 ml   Output             1170 ml   Net             -670 ml       Nutrition  Orders Placed This  Encounter   Procedures   • Diet Order Clear Liquid     Standing Status:   Standing     Number of Occurrences:   1     Order Specific Question:   Diet:     Answer:   Clear Liquid [10]     Physical Exam   Constitutional: She is oriented to person, place, and time. She appears well-developed and well-nourished.   HENT:   Head: Normocephalic and atraumatic.   Eyes: Pupils are equal, round, and reactive to light. Conjunctivae and EOM are normal.   Neck: Normal range of motion. Neck supple.   Cardiovascular: Normal rate and regular rhythm.    Pulmonary/Chest: Effort normal and breath sounds normal. No respiratory distress.   Abdominal: Soft. Bowel sounds are normal. She exhibits no distension.   Musculoskeletal: Normal range of motion. She exhibits no edema or tenderness.   Right flank tenderness to palpation   Neurological: She is alert and oriented to person, place, and time.   Skin: Skin is warm and dry.   Nursing note and vitals reviewed.      Recent Labs      08/25/18 2341 08/26/18   0524   08/26/18   1650  08/27/18   0054  08/27/18   0854   WBC  13.8*  10.8   --    --   7.5   --    RBC  3.51*  3.09*   --    --   2.57*   --    HEMOGLOBIN  10.5*  8.8*   < >  7.8*  7.4*  8.0*   HEMATOCRIT  31.3*  27.0*   < >  24.0*  23.0*  24.4*   MCV  89.2  87.4   --    --   89.5   --    MCH  29.9  28.5   --    --   28.8   --    MCHC  33.5*  32.6*   --    --   32.2*   --    RDW  41.4  41.2   --    --   42.4   --    PLATELETCT  257  186   --    --   170   --    MPV  10.5  10.4   --    --   10.1   --     < > = values in this interval not displayed.     Recent Labs      08/25/18 2341 08/27/18   0054   SODIUM  145  143   POTASSIUM  3.0*  3.6   CHLORIDE  110  111   CO2  21  27   GLUCOSE  200*  103*   BUN  19  13   CREATININE  1.09  0.73   CALCIUM  8.7  8.6     Recent Labs      08/25/18 2341 08/26/18   0524   APTT   --   37.9*   INR  1.16*   --                   Assessment/Plan     Retroperitoneal hematoma- (present on admission)    Assessment & Plan    She reports right flank pain, CT without contrast reveals large right retroperitoneal hematoma  Hemoglobin down from 10.5->8.1  Coagulation panel was normal   Monitor H&H daily, transfuse for hemoglobin less than 7 or if patient becomes unstable  Urology following        Sepsis ([facility])- (present on admission)   Assessment & Plan    This is sepsis (without associated acute organ dysfunction).   Likely source is pyelonephritis  Patient has been started on IV fluids per septic protocol  Lactate is elevated, trend to resolution  Patient is started on IV ceftriaxone  Follow blood and urine cultures          Anemia- (present on admission)   Assessment & Plan    Secondary to acute blood loss   Check iron studies, folate, B12 and TSH  Monitor daily hemoglobin and hematocrits  Stabilize          Pyelonephritis- (present on admission)   Assessment & Plan    Started on IV ceftriaxone  Follow urine cultures        Hypertension   Assessment & Plan    Hold BP meds at this time          Quality-Core Measures   Reviewed items::  Labs reviewed, Medications reviewed and Radiology images reviewed  DVT prophylaxis pharmacological::  Contraindicated - Anemia requiring blood transfusion  Antibiotics:  Treating active infection/contamination beyond 24 hours perioperative coverage

## 2018-08-27 NOTE — CARE PLAN
Problem: Safety  Goal: Will remain free from falls  Outcome: PROGRESSING AS EXPECTED  Bed is locked and in lowest position. Bed alarm is on. Call light is within reach. Demonstrated to pt how to call when needing to get out of bed.    Problem: Infection  Goal: Will remain free from infection  Outcome: PROGRESSING AS EXPECTED  Assessed pt for signs and symptoms of infection. Will continue to used standard precautions while giving care. Will monitor pt vital signs and lab values for signs of infection.

## 2018-08-27 NOTE — PROGRESS NOTES
Paged MD Chanel regarding patient pos urine saple. MD vargas with patient on rocephin already. No new orders yet

## 2018-08-28 LAB
ANION GAP SERPL CALC-SCNC: 7 MMOL/L (ref 0–11.9)
BACTERIA UR CULT: ABNORMAL
BACTERIA UR CULT: ABNORMAL
BASOPHILS # BLD AUTO: 0.3 % (ref 0–1.8)
BASOPHILS # BLD: 0.02 K/UL (ref 0–0.12)
BUN SERPL-MCNC: 6 MG/DL (ref 8–22)
CALCIUM SERPL-MCNC: 9.1 MG/DL (ref 8.5–10.5)
CHLORIDE SERPL-SCNC: 104 MMOL/L (ref 96–112)
CO2 SERPL-SCNC: 27 MMOL/L (ref 20–33)
CREAT SERPL-MCNC: 0.66 MG/DL (ref 0.5–1.4)
EOSINOPHIL # BLD AUTO: 0.24 K/UL (ref 0–0.51)
EOSINOPHIL NFR BLD: 3.3 % (ref 0–6.9)
ERYTHROCYTE [DISTWIDTH] IN BLOOD BY AUTOMATED COUNT: 38.8 FL (ref 35.9–50)
FERRITIN SERPL-MCNC: 110.1 NG/ML (ref 10–291)
FOLATE SERPL-MCNC: 23 NG/ML
GLUCOSE SERPL-MCNC: 122 MG/DL (ref 65–99)
HCT VFR BLD AUTO: 23.5 % (ref 37–47)
HGB BLD-MCNC: 8 G/DL (ref 12–16)
IMM GRANULOCYTES # BLD AUTO: 0.08 K/UL (ref 0–0.11)
IMM GRANULOCYTES NFR BLD AUTO: 1.1 % (ref 0–0.9)
IRON SATN MFR SERPL: 10 % (ref 15–55)
IRON SERPL-MCNC: 28 UG/DL (ref 40–170)
LYMPHOCYTES # BLD AUTO: 2.23 K/UL (ref 1–4.8)
LYMPHOCYTES NFR BLD: 31 % (ref 22–41)
MCH RBC QN AUTO: 29.2 PG (ref 27–33)
MCHC RBC AUTO-ENTMCNC: 34 G/DL (ref 33.6–35)
MCV RBC AUTO: 85.8 FL (ref 81.4–97.8)
MONOCYTES # BLD AUTO: 0.47 K/UL (ref 0–0.85)
MONOCYTES NFR BLD AUTO: 6.5 % (ref 0–13.4)
NEUTROPHILS # BLD AUTO: 4.16 K/UL (ref 2–7.15)
NEUTROPHILS NFR BLD: 57.8 % (ref 44–72)
NRBC # BLD AUTO: 0 K/UL
NRBC BLD-RTO: 0 /100 WBC
PLATELET # BLD AUTO: 190 K/UL (ref 164–446)
PMV BLD AUTO: 10 FL (ref 9–12.9)
POTASSIUM SERPL-SCNC: 3.3 MMOL/L (ref 3.6–5.5)
RBC # BLD AUTO: 2.74 M/UL (ref 4.2–5.4)
SIGNIFICANT IND 70042: ABNORMAL
SITE SITE: ABNORMAL
SODIUM SERPL-SCNC: 138 MMOL/L (ref 135–145)
SOURCE SOURCE: ABNORMAL
TIBC SERPL-MCNC: 287 UG/DL (ref 250–450)
VIT B12 SERPL-MCNC: 550 PG/ML (ref 211–911)
WBC # BLD AUTO: 7.2 K/UL (ref 4.8–10.8)

## 2018-08-28 PROCEDURE — 83540 ASSAY OF IRON: CPT

## 2018-08-28 PROCEDURE — A9270 NON-COVERED ITEM OR SERVICE: HCPCS | Performed by: INTERNAL MEDICINE

## 2018-08-28 PROCEDURE — 83550 IRON BINDING TEST: CPT

## 2018-08-28 PROCEDURE — 80048 BASIC METABOLIC PNL TOTAL CA: CPT

## 2018-08-28 PROCEDURE — 99232 SBSQ HOSP IP/OBS MODERATE 35: CPT | Performed by: INTERNAL MEDICINE

## 2018-08-28 PROCEDURE — 700102 HCHG RX REV CODE 250 W/ 637 OVERRIDE(OP): Performed by: INTERNAL MEDICINE

## 2018-08-28 PROCEDURE — 36415 COLL VENOUS BLD VENIPUNCTURE: CPT

## 2018-08-28 PROCEDURE — 700105 HCHG RX REV CODE 258: Performed by: HOSPITALIST

## 2018-08-28 PROCEDURE — 82728 ASSAY OF FERRITIN: CPT

## 2018-08-28 PROCEDURE — 82607 VITAMIN B-12: CPT

## 2018-08-28 PROCEDURE — 770020 HCHG ROOM/CARE - TELE (206)

## 2018-08-28 PROCEDURE — 85025 COMPLETE CBC W/AUTO DIFF WBC: CPT

## 2018-08-28 PROCEDURE — 700111 HCHG RX REV CODE 636 W/ 250 OVERRIDE (IP): Performed by: HOSPITALIST

## 2018-08-28 PROCEDURE — 82746 ASSAY OF FOLIC ACID SERUM: CPT

## 2018-08-28 RX ORDER — POTASSIUM CHLORIDE 20 MEQ/1
20 TABLET, EXTENDED RELEASE ORAL 2 TIMES DAILY
Status: DISCONTINUED | OUTPATIENT
Start: 2018-08-28 | End: 2018-08-29 | Stop reason: HOSPADM

## 2018-08-28 RX ADMIN — DOCUSATE SODIUM -SENNOSIDES 2 TABLET: 50; 8.6 TABLET, COATED ORAL at 17:37

## 2018-08-28 RX ADMIN — SODIUM CHLORIDE: 9 INJECTION, SOLUTION INTRAVENOUS at 05:20

## 2018-08-28 RX ADMIN — CEFTRIAXONE SODIUM 1 G: 1 INJECTION, POWDER, FOR SOLUTION INTRAMUSCULAR; INTRAVENOUS at 05:18

## 2018-08-28 RX ADMIN — DOCUSATE SODIUM -SENNOSIDES 2 TABLET: 50; 8.6 TABLET, COATED ORAL at 05:17

## 2018-08-28 RX ADMIN — POTASSIUM CHLORIDE 20 MEQ: 1500 TABLET, EXTENDED RELEASE ORAL at 17:37

## 2018-08-28 ASSESSMENT — PAIN SCALES - GENERAL
PAINLEVEL_OUTOF10: 0
PAINLEVEL_OUTOF10: 5

## 2018-08-28 ASSESSMENT — ENCOUNTER SYMPTOMS
ABDOMINAL PAIN: 1
CHILLS: 0
HEADACHES: 0
FEVER: 0
PALPITATIONS: 0
MYALGIAS: 0
VOMITING: 0
SHORTNESS OF BREATH: 0
DIZZINESS: 0
NERVOUS/ANXIOUS: 0
DEPRESSION: 0
DOUBLE VISION: 0
NAUSEA: 0
BLURRED VISION: 0
BACK PAIN: 0

## 2018-08-28 NOTE — CARE PLAN
Problem: Venous Thromboembolism (VTW)/Deep Vein Thrombosis (DVT) Prevention:  Goal: Patient will participate in Venous Thrombosis (VTE)/Deep Vein Thrombosis (DVT)Prevention Measures  Outcome: PROGRESSING AS EXPECTED  Pt understands the use of the SCDs and ambulation when bed rest is discontinued to prevent DVTs in the lower extremities.     Problem: Bowel/Gastric:  Goal: Normal bowel function is maintained or improved  Outcome: PROGRESSING AS EXPECTED  Pt educated on the use of stool softeners to aide in bowel maintenance and the importance of regular bowel movements

## 2018-08-28 NOTE — PROGRESS NOTES
Bedside report received from NOC JONATHON Colvin, pt is awake and alert with no reports of pain or discomfort. Bed is locked in lowest position with call light, belongings within reach, white board updated, and POC discussed. All needs met at this time.

## 2018-08-28 NOTE — PROGRESS NOTES
Report received from previous nurse. Patient encouraged to call for assistance. Call light within reach. Bed locked and in lowest position. Family at bedside.

## 2018-08-28 NOTE — PROGRESS NOTES
[facility] Hospitalist Progress Note    Date of Service: 2018    Chief Complaint  53 y.o. female admitted 2018 with right flank pain.  Found to have a retro-peritoneal hematoma and staghorn calculus    Interval Problem Update  -RP hematoma-no overt bleeding, no hematuria, H/H remains stable, persistent R sided pain.    Sepsis-remains afebrile, tolerating IV abx's without issue.     Consultants/Specialty  Urology    Disposition  To be determined        Review of Systems   Constitutional: Positive for malaise/fatigue. Negative for chills and fever.        Slightly better today   HENT: Negative for hearing loss.    Eyes: Negative for blurred vision and double vision.   Respiratory: Negative for shortness of breath.    Cardiovascular: Negative for chest pain, palpitations and leg swelling.   Gastrointestinal: Positive for abdominal pain (Right flank, no changes today). Negative for nausea and vomiting.   Genitourinary: Negative for dysuria and urgency.   Musculoskeletal: Negative for back pain and myalgias.   Skin: Negative for rash.   Neurological: Negative for dizziness and headaches.   Psychiatric/Behavioral: Negative for depression. The patient is not nervous/anxious.    All other systems reviewed and are negative.     Physical Exam  Laboratory/Imaging   Hemodynamics  Temp (24hrs), Av.3 °C (99.2 °F), Min:36.7 °C (98.1 °F), Max:37.9 °C (100.2 °F)   Temperature: 36.7 °C (98.1 °F)  Pulse  Av.4  Min: 62  Max: 99    Blood Pressure: 132/89      Respiratory      Respiration: 14, Pulse Oximetry: 94 %     Work Of Breathing / Effort: Mild  RUL Breath Sounds: Clear, RML Breath Sounds: Clear, RLL Breath Sounds: Diminished, BILLY Breath Sounds: Clear, LLL Breath Sounds: Diminished    Fluids    Intake/Output Summary (Last 24 hours) at 18 1458  Last data filed at 18 1200   Gross per 24 hour   Intake              860 ml   Output             2815 ml   Net            -1955 ml       Nutrition  Orders Placed  This Encounter   Procedures   • Diet Order Full Liquid     Standing Status:   Standing     Number of Occurrences:   1     Order Specific Question:   Diet:     Answer:   Full Liquid [11]     Physical Exam   Constitutional: She is oriented to person, place, and time. She appears well-developed and well-nourished.   HENT:   Head: Normocephalic and atraumatic.   Eyes: Pupils are equal, round, and reactive to light. Conjunctivae and EOM are normal.   Neck: Normal range of motion. Neck supple.   Cardiovascular: Normal rate and regular rhythm.    Pulmonary/Chest: Effort normal and breath sounds normal. No respiratory distress.   Abdominal: Soft. Bowel sounds are normal. She exhibits no distension.   Musculoskeletal: Normal range of motion. She exhibits no edema or tenderness.   Right flank tenderness to palpation   Neurological: She is alert and oriented to person, place, and time.   Skin: Skin is warm and dry.   Nursing note and vitals reviewed.      Recent Labs      08/26/18 0524 08/27/18 0054  08/27/18 0854  08/28/18   0336   WBC  10.8   --   7.5   --   7.2   RBC  3.09*   --   2.57*   --   2.74*   HEMOGLOBIN  8.8*   < >  7.4*  8.0*  8.0*   HEMATOCRIT  27.0*   < >  23.0*  24.4*  23.5*   MCV  87.4   --   89.5   --   85.8   MCH  28.5   --   28.8   --   29.2   MCHC  32.6*   --   32.2*   --   34.0   RDW  41.2   --   42.4   --   38.8   PLATELETCT  186   --   170   --   190   MPV  10.4   --   10.1   --   10.0    < > = values in this interval not displayed.     Recent Labs      08/25/18   2341 08/27/18 0054  08/28/18   0336   SODIUM  145  143  138   POTASSIUM  3.0*  3.6  3.3*   CHLORIDE  110  111  104   CO2  21  27  27   GLUCOSE  200*  103*  122*   BUN  19  13  6*   CREATININE  1.09  0.73  0.66   CALCIUM  8.7  8.6  9.1     Recent Labs      08/25/18   2341 08/26/18   0524   APTT   --   37.9*   INR  1.16*   --                   Assessment/Plan     Retroperitoneal hematoma- (present on admission)   Assessment & Plan     She reports right flank pain, CT without contrast reveals large right retroperitoneal hematoma  -H/H remaining stable for now.   Coagulation panel was normal   Monitor H&H daily, transfuse for hemoglobin less than 7 or if patient becomes unstable  Urology following, ?repeat CTAP in the next few weeks outpatient        Sepsis ([facility])- (present on admission)   Assessment & Plan    This is sepsis (without associated acute organ dysfunction).   Likely source is pyelonephritis  Continue IV fluids and IV antibiotic  -LA normal  -Pan sensitive E coli, continue above medications        Anemia- (present on admission)   Assessment & Plan    Secondary to acute blood loss   Check iron studies, folate, B12 and TSH  -stable at this time  -transfuse for less than 7/21          Pyelonephritis- (present on admission)   Assessment & Plan    -continue IV CTX, good clinical response at this time  -staghorn calculus on the L side  -urology following        Hypertension- (present on admission)   Assessment & Plan    Hold BP meds at this time, consider re-adding slowly          Quality-Core Measures   Reviewed items::  Labs reviewed, Medications reviewed and Radiology images reviewed  DVT prophylaxis pharmacological::  Contraindicated - Anemia requiring blood transfusion  Antibiotics:  Treating active infection/contamination beyond 24 hours perioperative coverage

## 2018-08-28 NOTE — CARE PLAN
Problem: Communication  Goal: The ability to communicate needs accurately and effectively will improve  Outcome: PROGRESSING AS EXPECTED  Patient is Bahamian speaking only.  line utilized.     Problem: Pain Management  Goal: Pain level will decrease to patient's comfort goal  Outcome: PROGRESSING AS EXPECTED  Patient's pain is being managed with prn pain medications.

## 2018-08-28 NOTE — PROGRESS NOTES
Urology Progress Note      Interval Events: None-     S: No fevers, chills, nausea or vomiting. + flatus.     O:   Blood pressure 132/89, pulse 82, temperature 36.7 °C (98.1 °F), resp. rate 14, weight 75 kg (165 lb 5.5 oz), SpO2 94 %.  Recent Labs      08/25/18   2341  08/27/18   0054  08/28/18   0336   SODIUM  145  143  138   POTASSIUM  3.0*  3.6  3.3*   CHLORIDE  110  111  104   CO2  21  27  27   GLUCOSE  200*  103*  122*   BUN  19  13  6*   CREATININE  1.09  0.73  0.66   CALCIUM  8.7  8.6  9.1     Recent Labs      08/26/18   0524   08/27/18   0054  08/27/18   0854  08/28/18   0336   WBC  10.8   --   7.5   --   7.2   RBC  3.09*   --   2.57*   --   2.74*   HEMOGLOBIN  8.8*   < >  7.4*  8.0*  8.0*   HEMATOCRIT  27.0*   < >  23.0*  24.4*  23.5*   MCV  87.4   --   89.5   --   85.8   MCH  28.5   --   28.8   --   29.2   MCHC  32.6*   --   32.2*   --   34.0   RDW  41.2   --   42.4   --   38.8   PLATELETCT  186   --   170   --   190   MPV  10.4   --   10.1   --   10.0    < > = values in this interval not displayed.         Intake/Output Summary (Last 24 hours) at 08/28/18 1534  Last data filed at 08/28/18 1200   Gross per 24 hour   Intake              860 ml   Output             2815 ml   Net            -1955 ml       Exam:  Abdomen soft, benign.    Urine: clear yellow.     A/P:    Active Hospital Problems    Diagnosis   • Retroperitoneal hematoma [K66.1]     Priority: High   • Sepsis (HCC) [A41.9]     Priority: Medium   • Hypertension [I10]     Priority: Low   • Pyelonephritis [N12]   • Anemia [D64.9]       Continue bedrest  No NSAIDS or anticoagulants  She will need outpatient treatment of left staghorn    Plan of care directed by MD Balwinder Sheets, A.P.R.N.

## 2018-08-29 ENCOUNTER — PATIENT OUTREACH (OUTPATIENT)
Dept: HEALTH INFORMATION MANAGEMENT | Facility: OTHER | Age: 53
End: 2018-08-29

## 2018-08-29 VITALS
TEMPERATURE: 98.4 F | HEART RATE: 86 BPM | BODY MASS INDEX: 30.7 KG/M2 | RESPIRATION RATE: 18 BRPM | SYSTOLIC BLOOD PRESSURE: 137 MMHG | DIASTOLIC BLOOD PRESSURE: 79 MMHG | OXYGEN SATURATION: 93 % | WEIGHT: 157.19 LBS

## 2018-08-29 PROBLEM — N12 PYELONEPHRITIS: Status: RESOLVED | Noted: 2018-08-26 | Resolved: 2018-08-29

## 2018-08-29 PROBLEM — A41.9 SEPSIS (HCC): Status: RESOLVED | Noted: 2018-08-26 | Resolved: 2018-08-29

## 2018-08-29 LAB
ANION GAP SERPL CALC-SCNC: 10 MMOL/L (ref 0–11.9)
BASOPHILS # BLD AUTO: 0.4 % (ref 0–1.8)
BASOPHILS # BLD: 0.04 K/UL (ref 0–0.12)
BUN SERPL-MCNC: 7 MG/DL (ref 8–22)
CALCIUM SERPL-MCNC: 9.9 MG/DL (ref 8.5–10.5)
CHLORIDE SERPL-SCNC: 103 MMOL/L (ref 96–112)
CO2 SERPL-SCNC: 26 MMOL/L (ref 20–33)
CREAT SERPL-MCNC: 0.65 MG/DL (ref 0.5–1.4)
EOSINOPHIL # BLD AUTO: 0.22 K/UL (ref 0–0.51)
EOSINOPHIL NFR BLD: 2.3 % (ref 0–6.9)
ERYTHROCYTE [DISTWIDTH] IN BLOOD BY AUTOMATED COUNT: 38.3 FL (ref 35.9–50)
GLUCOSE SERPL-MCNC: 119 MG/DL (ref 65–99)
HCT VFR BLD AUTO: 25.8 % (ref 37–47)
HGB BLD-MCNC: 9 G/DL (ref 12–16)
IMM GRANULOCYTES # BLD AUTO: 0.1 K/UL (ref 0–0.11)
IMM GRANULOCYTES NFR BLD AUTO: 1 % (ref 0–0.9)
LYMPHOCYTES # BLD AUTO: 3.08 K/UL (ref 1–4.8)
LYMPHOCYTES NFR BLD: 32.3 % (ref 22–41)
MAGNESIUM SERPL-MCNC: 1.7 MG/DL (ref 1.5–2.5)
MCH RBC QN AUTO: 29.9 PG (ref 27–33)
MCHC RBC AUTO-ENTMCNC: 34.9 G/DL (ref 33.6–35)
MCV RBC AUTO: 85.7 FL (ref 81.4–97.8)
MONOCYTES # BLD AUTO: 0.54 K/UL (ref 0–0.85)
MONOCYTES NFR BLD AUTO: 5.7 % (ref 0–13.4)
NEUTROPHILS # BLD AUTO: 5.57 K/UL (ref 2–7.15)
NEUTROPHILS NFR BLD: 58.3 % (ref 44–72)
NRBC # BLD AUTO: 0.02 K/UL
NRBC BLD-RTO: 0.2 /100 WBC
PLATELET # BLD AUTO: 260 K/UL (ref 164–446)
PMV BLD AUTO: 10 FL (ref 9–12.9)
POTASSIUM SERPL-SCNC: 3.8 MMOL/L (ref 3.6–5.5)
RBC # BLD AUTO: 3.01 M/UL (ref 4.2–5.4)
SODIUM SERPL-SCNC: 139 MMOL/L (ref 135–145)
WBC # BLD AUTO: 9.6 K/UL (ref 4.8–10.8)

## 2018-08-29 PROCEDURE — 99239 HOSP IP/OBS DSCHRG MGMT >30: CPT | Performed by: INTERNAL MEDICINE

## 2018-08-29 PROCEDURE — 80048 BASIC METABOLIC PNL TOTAL CA: CPT

## 2018-08-29 PROCEDURE — 700111 HCHG RX REV CODE 636 W/ 250 OVERRIDE (IP): Performed by: HOSPITALIST

## 2018-08-29 PROCEDURE — 85025 COMPLETE CBC W/AUTO DIFF WBC: CPT

## 2018-08-29 PROCEDURE — 36415 COLL VENOUS BLD VENIPUNCTURE: CPT

## 2018-08-29 PROCEDURE — 700102 HCHG RX REV CODE 250 W/ 637 OVERRIDE(OP): Performed by: INTERNAL MEDICINE

## 2018-08-29 PROCEDURE — 700105 HCHG RX REV CODE 258: Performed by: HOSPITALIST

## 2018-08-29 PROCEDURE — 83735 ASSAY OF MAGNESIUM: CPT

## 2018-08-29 PROCEDURE — A9270 NON-COVERED ITEM OR SERVICE: HCPCS | Performed by: INTERNAL MEDICINE

## 2018-08-29 PROCEDURE — 700105 HCHG RX REV CODE 258: Performed by: INTERNAL MEDICINE

## 2018-08-29 RX ORDER — CEFDINIR 300 MG/1
300 CAPSULE ORAL 2 TIMES DAILY
Qty: 14 CAP | Refills: 0 | Status: SHIPPED | OUTPATIENT
Start: 2018-08-29 | End: 2018-09-05

## 2018-08-29 RX ADMIN — POTASSIUM CHLORIDE 20 MEQ: 1500 TABLET, EXTENDED RELEASE ORAL at 06:10

## 2018-08-29 RX ADMIN — CEFTRIAXONE SODIUM 1 G: 1 INJECTION, POWDER, FOR SOLUTION INTRAMUSCULAR; INTRAVENOUS at 06:09

## 2018-08-29 RX ADMIN — SODIUM CHLORIDE: 9 INJECTION, SOLUTION INTRAVENOUS at 04:07

## 2018-08-29 ASSESSMENT — PAIN SCALES - GENERAL: PAINLEVEL_OUTOF10: 4

## 2018-08-29 NOTE — CARE PLAN
Problem: Safety  Goal: Will remain free from injury  Outcome: PROGRESSING AS EXPECTED  Pt remains free from falls or injuries. Bed alarm set and pt calls for assistance appropriately. Treaded socks in place. Pt 1-1 assist for turning/repositioning.     Problem: Pain Management  Goal: Pain level will decrease to patient's comfort goal  Outcome: PROGRESSING AS EXPECTED  Pt resting comfortably in bed at this time, no complaints of pain. PRN pain medications available for pain management. Pt instructed to call for assistance with pain management, as needed. Pt verbalized understanding.

## 2018-08-29 NOTE — DISCHARGE INSTRUCTIONS
Discharge Instructions    Discharged to home by car with relative. Discharged via wheelchair, hospital escort: Yes.  Special equipment needed: Not Applicable    Be sure to schedule a follow-up appointment with your primary care doctor or any specialists as instructed.     Discharge Plan:   Influenza Vaccine Indication: Not indicated: Previously immunized this influenza season and > 8 years of age    I understand that a diet low in cholesterol, fat, and sodium is recommended for good health. Unless I have been given specific instructions below for another diet, I accept this instruction as my diet prescription.   Other diet: Heart healthy    Instructions from the MD:   Diet low fat, low sugar, heart healthy with 9-13 servings of fruits and vegetables   Activities as tolerated   Follow ups with PCP in 7-10 days, call for appointment   Meds per med rec sheet   No smoking, no alcohol, no caffeine   Wear seat belt in motorized vehicle   Take medications as perscribed   Keep appointments   If symptoms worsen call PCP, 911 or urgent care.    Special Instructions: None    · Is patient discharged on Warfarin / Coumadin?   No       Dolor en el flanco   (Flank Pain)   El dolor en el flanco es el que se siente a un lado del cuerpo. El flanco es la barbara en un lado del cuerpo, entre la parte superior del vientre (abdomen) y la espalda. El dolor en esta barbara puede tener diferentes causas.  CUIDADOS EN EL HOGAR   Los cuidados en el hogar dependerán de la causa del dolor.   · Sylwia reposo isabel van le indicó el médico.  · Sharon gran cantidad de líquido para mantener el pis (orina) de lu tomás o amarillo pálido.    · Sólo tome los medicamentos que le indique el médico.  · Informe al médico si el dolor se modifica.  · Concurra a las visitas de control con el médico.  SOLICITE AYUDA DE INMEDIATO SI:   · El dolor no mejora con los medicamentos prescriptos.    · Tiene nuevos síntomas o estos empeoran.  · El dolor empeora.    · Siente  dolor en el vientre (abdominal).    · Comienza a sentir falta de aire.    · Siente malestar estomacal (náuseas).    · Comienza a vomitar.    · El vientre se inflama (se hincha).    · Se siente mareado o se desvanece (se desmaya).    · Observa asif en la orina.  · Tiene fiebre o síntomas que persisten patel más de 2-3 ivon.  · Tiene fiebre y los síntomas empeoran repentinamente.  ASEGÚRESE DE QUE:   · Comprende estas instrucciones.  · Controlará wilkins enfermedad.  · Solicitará ayuda de inmediato si no mejora o si empeora.  Esta información no tiene van fin reemplazar el consejo del médico. Asegúrese de hacerle al médico cualquier pregunta que tenga.  Document Released: 09/11/2013  [employer] Interactive Patient Education © 2017 [employer]    Depression / Suicide Risk    As you are discharged from this [facility] facility, it is important to learn how to keep safe from harming yourself.    Recognize the warning signs:  · Abrupt changes in personality, positive or negative- including increase in energy   · Giving away possessions  · Change in eating patterns- significant weight changes-  positive or negative  · Change in sleeping patterns- unable to sleep or sleeping all the time   · Unwillingness or inability to communicate  · Depression  · Unusual sadness, discouragement and loneliness  · Talk of wanting to die  · Neglect of personal appearance   · Rebelliousness- reckless behavior  · Withdrawal from people/activities they love  · Confusion- inability to concentrate     If you or a loved one observes any of these behaviors or has concerns about self-harm, here's what you can do:  · Talk about it- your feelings and reasons for harming yourself  · Remove any means that you might use to hurt yourself (examples: pills, rope, extension cords, firearm)  · Get professional help from the community (Mental Health, Substance Abuse, psychological counseling)  · Do not be alone:Call your Safe Contact- someone whom you  trust who will be there for you.  · Call your local CRISIS HOTLINE 373-1705 or 277-688-1245  · Call your local Children's Mobile Crisis Response Team [place] (415) 772-6881 or www.[employer]  · Call the toll free National Suicide Prevention Hotlines   · National Suicide Prevention Lifeline 773-763-SRQJ (9287)  · [employer] Line Network 800-SUICIDE (597-5628)          Hemorragia retroperitoneal  (Retroperitoneal Bleeding)  La hemorragia retroperitoneal se produce cuando los vasos sanguíneos, que están detrás del abdomen, sangran en el espacio que se encuentra entre el abdomen y la espalda (espacio retroperitoneal). En carmen espacio se encuentra lo siguiente:  · Los riñones.  · Las glándulas que se encuentran arriba de los riñones (glándulas suprarrenales).  · Los conductos que drenan orina de los riñones (uréteres).  · El vaso sanguíneo chloe que transporta asif a la parte inferior del cuerpo (aorta).  · Algunas partes del tubo digestivo.  La hemorragia puede provenir de cualquiera de los órganos que se encuentran en el espacio retroperitoneal. Esta es yue enfermedad poco frecuente baldo potencialmente mortal.  CAUSAS  Carmen trastorno puede ser causado por:  · Cirugía en el espacio retroperitoneal.  · Lesión (traumatismo) en la barbara pélvica, el abdomen o la espalda.  Cuando la hemorragia retroperitoneal no es causada por un traumatismo, se denomina hemorragia retroperitoneal espontánea. La hemorragia retroperitoneal espontánea puede ser causada por lo siguiente:  · Tumores en los riñones o las glándulas suprarrenales.  · Uso de medicamentos anticoagulantes.  · Enfermedades que hacen que el cuerpo sea incapaz de formar coágulos sanguíneos (trastornos de coagulación).  · Hinchazón de un vaso sanguíneo debido a yue pared arterial debilitada (aneurisma), que puede romperse y causar yue hemorragia.  Algunas veces la causa no se conoce (idiopática).  FACTORES DE RIESGO  Es más probable que esta afección se  manifieste en las personas que:  · Se someten a diálisis.  · Marla anticoagulantes.  · Tienen hipertensión arterial.  · Presentan endurecimiento de las arterias (ateroesclerosis).  SÍNTOMAS  Los signos y síntomas pueden aparecer de manera repentina si la hemorragia comienza de pronto (aguda), o audrey gradualmente si la hemorragia se produce lentamente con el tiempo (crónica). Los síntomas de esta afección incluyen lo siguiente:  · Dolor en el abdomen o en un costado (fosa lumbar).  · Dolor al ejercer presión sobre el abdomen o la fosa lumbar.  · Mareos o sensación de desvanecimiento debido a la presión arterial baja.  · Frecuencia cardíaca rápida (taquicardia).  · Asif en la orina.  · Presión arterial muy baja, que puede causar un choque. Los síntomas del choque incluyen los siguientes:  ¨ Desmayos.  ¨ Problemas respiratorios.  ¨ Piel fría y húmeda.  DIAGNÓSTICO  Esta afección puede diagnosticarse en función de los síntomas y la historia clínica. Es importante determinar la causa de la hemorragia. El médico le hará un examen físico. También pueden hacerle exámenes que incluyen:  · Radiografías abdominales para controlar los signos de la hemorragia o el aneurisma.  · Análisis de asif para controlar los recuentos bajos de glóbulos rojos (anemia) o la pérdida de asif.  · Estudios de diagnóstico por imágenes, por ejemplo:  ¨ Tomografía computarizada. Pueden inyectarle yue sustancia de contraste en un vaso sanguíneo para ayudar a localizar el origen de la hemorragia (angiografía por tomografía computarizada).  ¨ Ecografía.  ¨ Resonancia magnética.  TRATAMIENTO  El objetivo del tratamiento es extraer la asif y detener la hemorragia. El tratamiento puede incluir lo siguiente:  · Administración de líquidos a través de yue vía intravenosa para que la presión arterial regrese a alesia valores normales.  · Transfusión de asif de un donante.  · Colocación de un tubo maria de jesus (catéter) a través del vaso sanguíneo en el lugar  de la hemorragia y bloqueo de la hemorragia con un pequeño tapón (embolización).  · Cirugía exploratoria para detectar la hemorragia y detenerla. Jane Lew puede realizarse con un microscopio quirúrgico (laparoscopia) o van yue cirugía abierta (laparotomía).  Esta información no tiene van fin reemplazar el consejo del médico. Asegúrese de hacerle al médico cualquier pregunta que tenga.  Document Released: 04/05/2010 Document Revised: 01/08/2016 Document Reviewed: 09/23/2015  [employer] Patient Education © 2017 Elsevier Inc.      Hematoma  (Hematoma)  Un hematoma es yue acumulación de asif debajo de la piel, en un órgano, en un sitio del cuerpo, en un espacio articular, o en otro tejido. La asif puede coagularse para formar yue masa que se puede ferdinand y sentir. El bulto suele ser firme, y a veces doloroso y sensible. La mayoría de los hematomas mejoran en unos pocos días o semanas. Sin embargo, algunos hematomas pueden ser graves y requieren atención médica. Los hematomas pueden variar en tamaño desde muy pequeños hasta muy grandes.  CAUSAS  La causa de un hematoma puede ser un traumatismo cerrado o penetrante. Otra causa puede ser la pérdida de asif espontánea de un vaso sanguíneo bajo la piel. La pérdida espontánea de asif en un vaso sanguíneo es probable que ocurra en personas de edad avanzada, especialmente en los que josseline anticoagulantes. A veces, un hematoma puede aparecer después de ciertos procedimientos médicos.  SIGNOS Y SÍNTOMAS  · Un bulto firme en el cuerpo.  · Dolor y sensibilidad en el área.  · [place]. La piel puede aparecer de color marry, demarco púrpura o amarilla en el sitio del hematoma, si está cerca de la superficie de la piel.  En los hematomas que se encuentran en tejidos más profundos, los signos y los síntomas pueden ser sutiles. Por ejemplo, un hematoma intraabdominal puede causar dolor, debilidad, desmayos y dificultad para respirar. Un hematoma intracraneal puede causar dolor  de laura o síntomas van debilidad, dificultad para hablar o un cambio en el estado de conciencia.  DIAGNÓSTICO  El hematoma generalmente se diagnostica basándose en la historia clínica y con un examen físico. Será necesario realizar un diagnóstico por imágenes si el médico sospecha un hematoma en tejidos profundos o espacios corporales, van el abdomen, la laura o el tórax. Estos estudios pueden incluir yue ecografía o yue tomografía computada.  TRATAMIENTO  Los hematomas generalmente desaparecen por sí mismos con el tiempo. Sera vez la asif debe ser drenada. Los hematomas más grandes o los que puedan afectar órganos vitales requerirán un drenaje quirúrgico o controles.  INSTRUCCIONES PARA EL CUIDADO EN EL HOGAR  · Aplique hielo sobre la barbara lesionada:  ¨ Ponga el hielo en yue bolsa plástica.  ¨ Colóquese yue toalla entre la piel y la bolsa de hielo.  ¨ Deje el hielo patel 20 minutos 2 a 3 veces por día, patel los 1 o 2 primeros días.  · Después de los primeros 2 días, aplique compresas calientes sobre el hematoma.  · Eleve el área lesionada para ayudar a reducir el dolor y la hinchazón. Envolver la barbara con un vendaje elástico también puede ser útil. La compresión ayuda a reducir la inflamación y promueve la reducción del hematoma. Asegúrese de que el vendaje no se ajusta demasiado.  · Si el hematoma se produjo en yue extremidad inferior y es dolorosa, puede aliviarlo el uso de muletas patel algunos días.  · [place] sólo medicamentos de venta melanie o recetados, según las indicaciones del médico.  SOLICITE ATENCIÓN MÉDICA DE INMEDIATO SI:  · Aumenta el dolor y no puede controlarlo con la medicación.  · Tiene fiebre.  · La hinchazón o la decoloración aumentan.  · La piel sobre el hematoma se rompe o comienza a sangrar.  · El hematoma se encuentra en el tórax o el abdomen y siente debilidad, le falta el aire o cambia wilkins estado de conciencia.  · El hematoma se encuentra en el cuero cabelludo (causado por yue  caída o yue lesión) y tiene un dolor de laura que empeora o hay un cambio en el estado de alerta o de conciencia.  ASEGÚRESE DE QUE:  · Comprende estas instrucciones.  · Controlará wilkins afección.  · Recibirá ayuda de inmediato si no mejora o si empeora.  Esta información no tiene van fin reemplazar el consejo del médico. Asegúrese de hacerle al médico cualquier pregunta que tenga.  Document Released: 03/26/2009 Document Revised: 08/20/2014 Document Reviewed: 05/28/2014  Elsevier Interactive Patient Education © 2017 Elsevier Inc.

## 2018-08-29 NOTE — PROGRESS NOTES
Patient discharge. Patient left via wheelchair with ANNABELLE Duran to meet daughter at [facility]. Discharge instructions discussed with the daughter and patient and both verbalized understanding of the information that was provided in the discharge paperwork. Patient removed from monitor and monitor room notified. All patient belongings left with patient.

## 2018-08-29 NOTE — PROGRESS NOTES
Urology Progress Note    Overnight Events: None    S: No fevers, chills, nausea or vomiting.  Still with right flank pain. H/H stable.    O:   Blood pressure 139/75, pulse 72, temperature 36.3 °C (97.4 °F), resp. rate 18, weight 71.3 kg (157 lb 3 oz), SpO2 98 %, not currently breastfeeding.  Recent Labs      08/27/18   0054  08/28/18   0336  08/29/18   0018   SODIUM  143  138  139   POTASSIUM  3.6  3.3*  3.8   CHLORIDE  111  104  103   CO2  27  27  26   GLUCOSE  103*  122*  119*   BUN  13  6*  7*   CREATININE  0.73  0.66  0.65   CALCIUM  8.6  9.1  9.9     Recent Labs      08/27/18   0054  08/27/18   0854  08/28/18 0336  08/29/18   0018   WBC  7.5   --   7.2  9.6   RBC  2.57*   --   2.74*  3.01*   HEMOGLOBIN  7.4*  8.0*  8.0*  9.0*   HEMATOCRIT  23.0*  24.4*  23.5*  25.8*   MCV  89.5   --   85.8  85.7   MCH  28.8   --   29.2  29.9   MCHC  32.2*   --   34.0  34.9   RDW  42.4   --   38.8  38.3   PLATELETCT  170   --   190  260   MPV  10.1   --   10.0  10.0         Intake/Output Summary (Last 24 hours) at 08/29/18 1006  Last data filed at 08/28/18 1200   Gross per 24 hour   Intake              360 ml   Output              300 ml   Net               60 ml       Exam:  Abdomen soft, benign.    Urine: clear      A/P:    Active Hospital Problems    Diagnosis   • Retroperitoneal hematoma [K66.1]     Priority: High   • Sepsis (HCC) [A41.9]     Priority: Medium   • Hypertension [I10]     Priority: Low   • Pyelonephritis [N12]   • Anemia [D64.9]       H/H stable at 9/25.8  No further urology intervention needed at this time  F/U with Dr Britt to discuss PCNL for staghorn calculus  OP CT scan in 3-4 weeks.

## 2018-08-29 NOTE — DISCHARGE SUMMARY
Discharge Summary    CHIEF COMPLAINT ON ADMISSION  Chief Complaint   Patient presents with   • Back Pain       Reason for Admission  ems     Admission Date  8/25/2018    CODE STATUS  Full Code    HPI & HOSPITAL COURSE  This is a 53 y.o. female here with above medical issues. She was found on admission CT abdomen/pelvis to have left sided staghorn calculus along with right sided retroperitoneal hematoma with no clear renal mass. Her urine was noted to have an infection and she was empirically started on IV ceftriaxone.  Blood cultures remain no growth to date. Her urine culture grew pan sensitive E coli. Urology was consulted and they recommended close follow up. Patient did well and her hemoglobin/crit remained stable. She will need close follow up with urology in 3 weeks with repeat CTAP with IV contrast in 1 month to re-assess size of RP hematoma and determine if any renal mass is present in that area.       Therefore, she is discharged in fair and stable condition to home with close outpatient follow-up.    The patient met 2-midnight criteria for an inpatient stay at the time of discharge.    Discharge Date  8/29/18    FOLLOW UP ITEMS POST DISCHARGE  F/U with her PCP In 2 weeks  F/U with urology in 3-4 weeks    DISCHARGE DIAGNOSES  Active Problems:    Retroperitoneal hematoma POA: Yes    Hypertension POA: Yes    Anemia POA: Yes  Resolved Problems:    Sepsis (HCC) POA: Yes    Pyelonephritis POA: Yes      FOLLOW UP  No future appointments.  MYCHAL Collier  [address]  [address]  [place] [address]  [phone]    In 1 week      Rafael Britt M.D.  [id number]  [facility] [address] [phone]    In 3 weeks  follow up for kidney stones and bleed      MEDICATIONS ON DISCHARGE     Medication List      START taking these medications      Instructions   cefdinir 300 MG Caps  Commonly known as:  OMNICEF   Take 1 Cap by mouth 2 times a day for 7 days.  Dose:  300 mg            Allergies  No Known  Allergies    DIET  Orders Placed This Encounter   Procedures   • Diet Order Low Fiber(GI Soft), Renal     Standing Status:   Standing     Number of Occurrences:   1     Order Specific Question:   Diet:     Answer:   Low Fiber(GI Soft) [2]     Order Specific Question:   Diet:     Answer:   Renal [8]       ACTIVITY  As tolerated.  Weight bearing as tolerated    CONSULTATIONS  urology    PROCEDURES  CT-ABDOMEN & PELVIS UROGRAM   Final Result         1. Unchanged perinephric and retroperitoneal hematoma. The origin of the hemorrhage is unknown. No clear mass in the right kidney. No evidence of right hydronephrosis. Mild right ureteral thickening could relate to reactive change or    infection/inflammation.      2. Obstructing left staghorn calculus with stagnant layering contrast in the dilated left renal pelvis and no contrast opacification of the left ureter.      3. Cholelithiasis.      4. Hepatic steatosis.            CT-RENAL COLIC EVALUATION(A/P W/O)   Final Result         1.  Perinephric and large right retroperitoneal hematoma. Source of hemorrhage unknown. Noncontrast technique limits evaluation for active extravasation.   2.  Hepatomegaly and diffuse hepatic steatosis   3.  Cholelithiasis      These findings were discussed with the patient's clinician, ALYSSA SORTO, on 8/26/2018 4:12 AM.            LABORATORY  Lab Results   Component Value Date    SODIUM 139 08/29/2018    POTASSIUM 3.8 08/29/2018    CHLORIDE 103 08/29/2018    CO2 26 08/29/2018    GLUCOSE 119 (H) 08/29/2018    BUN 7 (L) 08/29/2018    CREATININE 0.65 08/29/2018        Lab Results   Component Value Date    WBC 9.6 08/29/2018    HEMOGLOBIN 9.0 (L) 08/29/2018    HEMATOCRIT 25.8 (L) 08/29/2018    PLATELETCT 260 08/29/2018        Total time of the discharge process exceeds 35 minutes.

## 2018-08-29 NOTE — PROGRESS NOTES
Pt care assumed. Pt lying comfortably in bed. A&O x 4. Family present at bedside. No distress present. Call light, phone, and bedside table within reach. White board updated and plan of care discussed with patient. Bed alarm set and pt calls for assistance appropriately. No concerns present at this time. Will continue to monitor.

## 2018-08-29 NOTE — PROGRESS NOTES
Received report from night shift RN Nida. Assumed care of patient. Patient is SR on the monitor at this time. Patient is sitting up in bed with no family present at bedside at this time. Patient declines any needs at this time. Plan of care discussed with patient. Bed locked and in the lowest position with call light within reach.

## 2018-08-29 NOTE — PROGRESS NOTES
Pt transferred from 831-1 to 836-1. Belongings brought to new room. Monitor switched and monitor room notified of pt's new room.

## 2018-08-31 LAB
BACTERIA BLD CULT: NORMAL
BACTERIA BLD CULT: NORMAL
SIGNIFICANT IND 70042: NORMAL
SIGNIFICANT IND 70042: NORMAL
SITE SITE: NORMAL
SITE SITE: NORMAL
SOURCE SOURCE: NORMAL
SOURCE SOURCE: NORMAL

## 2018-10-03 ENCOUNTER — HOSPITAL ENCOUNTER (OUTPATIENT)
Dept: RADIOLOGY | Facility: MEDICAL CENTER | Age: 53
End: 2018-10-03
Attending: UROLOGY
Payer: MEDICAID

## 2018-10-03 DIAGNOSIS — N13.30 HYDRONEPHROSIS, UNSPECIFIED HYDRONEPHROSIS TYPE: ICD-10-CM

## 2018-10-03 PROCEDURE — 78708 K FLOW/FUNCT IMAGE W/DRUG: CPT

## 2018-10-03 RX ORDER — FUROSEMIDE 10 MG/ML
INJECTION INTRAMUSCULAR; INTRAVENOUS
Status: DISPENSED
Start: 2018-10-03 | End: 2018-10-03

## 2019-03-08 ENCOUNTER — APPOINTMENT (OUTPATIENT)
Dept: RADIOLOGY | Facility: MEDICAL CENTER | Age: 54
End: 2019-03-08
Attending: EMERGENCY MEDICINE
Payer: MEDICAID

## 2019-03-08 ENCOUNTER — HOSPITAL ENCOUNTER (OUTPATIENT)
Facility: MEDICAL CENTER | Age: 54
End: 2019-03-09
Attending: EMERGENCY MEDICINE | Admitting: INTERNAL MEDICINE
Payer: MEDICAID

## 2019-03-08 DIAGNOSIS — R10.9 FLANK PAIN: ICD-10-CM

## 2019-03-08 DIAGNOSIS — N20.0 STAGHORN RENAL CALCULUS: ICD-10-CM

## 2019-03-08 DIAGNOSIS — N12 PYELONEPHRITIS: ICD-10-CM

## 2019-03-08 LAB
ALBUMIN SERPL BCP-MCNC: 4.3 G/DL (ref 3.2–4.9)
ALBUMIN/GLOB SERPL: 1.3 G/DL
ALP SERPL-CCNC: 97 U/L (ref 30–99)
ALT SERPL-CCNC: 59 U/L (ref 2–50)
ANION GAP SERPL CALC-SCNC: 8 MMOL/L (ref 0–11.9)
APPEARANCE UR: CLEAR
AST SERPL-CCNC: 36 U/L (ref 12–45)
BACTERIA #/AREA URNS HPF: NEGATIVE /HPF
BASOPHILS # BLD AUTO: 0.7 % (ref 0–1.8)
BASOPHILS # BLD: 0.06 K/UL (ref 0–0.12)
BILIRUB SERPL-MCNC: 0.4 MG/DL (ref 0.1–1.5)
BILIRUB UR QL STRIP.AUTO: NEGATIVE
BUN SERPL-MCNC: 15 MG/DL (ref 8–22)
CALCIUM SERPL-MCNC: 10.3 MG/DL (ref 8.5–10.5)
CHLORIDE SERPL-SCNC: 106 MMOL/L (ref 96–112)
CO2 SERPL-SCNC: 24 MMOL/L (ref 20–33)
COLOR UR: YELLOW
CREAT SERPL-MCNC: 0.66 MG/DL (ref 0.5–1.4)
EKG IMPRESSION: NORMAL
EOSINOPHIL # BLD AUTO: 0.35 K/UL (ref 0–0.51)
EOSINOPHIL NFR BLD: 4.1 % (ref 0–6.9)
EPI CELLS #/AREA URNS HPF: NEGATIVE /HPF
ERYTHROCYTE [DISTWIDTH] IN BLOOD BY AUTOMATED COUNT: 37.4 FL (ref 35.9–50)
GLOBULIN SER CALC-MCNC: 3.4 G/DL (ref 1.9–3.5)
GLUCOSE SERPL-MCNC: 130 MG/DL (ref 65–99)
GLUCOSE UR STRIP.AUTO-MCNC: NEGATIVE MG/DL
HCT VFR BLD AUTO: 42 % (ref 37–47)
HGB BLD-MCNC: 14.6 G/DL (ref 12–16)
HYALINE CASTS #/AREA URNS LPF: ABNORMAL /LPF
IMM GRANULOCYTES # BLD AUTO: 0.05 K/UL (ref 0–0.11)
IMM GRANULOCYTES NFR BLD AUTO: 0.6 % (ref 0–0.9)
KETONES UR STRIP.AUTO-MCNC: NEGATIVE MG/DL
LEUKOCYTE ESTERASE UR QL STRIP.AUTO: ABNORMAL
LIPASE SERPL-CCNC: 41 U/L (ref 11–82)
LYMPHOCYTES # BLD AUTO: 3.21 K/UL (ref 1–4.8)
LYMPHOCYTES NFR BLD: 38 % (ref 22–41)
MCH RBC QN AUTO: 29.9 PG (ref 27–33)
MCHC RBC AUTO-ENTMCNC: 34.8 G/DL (ref 33.6–35)
MCV RBC AUTO: 86.1 FL (ref 81.4–97.8)
MICRO URNS: ABNORMAL
MONOCYTES # BLD AUTO: 0.62 K/UL (ref 0–0.85)
MONOCYTES NFR BLD AUTO: 7.3 % (ref 0–13.4)
NEUTROPHILS # BLD AUTO: 4.15 K/UL (ref 2–7.15)
NEUTROPHILS NFR BLD: 49.3 % (ref 44–72)
NITRITE UR QL STRIP.AUTO: NEGATIVE
NRBC # BLD AUTO: 0 K/UL
NRBC BLD-RTO: 0 /100 WBC
PH UR STRIP.AUTO: 6 [PH]
PLATELET # BLD AUTO: 262 K/UL (ref 164–446)
PMV BLD AUTO: 10.2 FL (ref 9–12.9)
POTASSIUM SERPL-SCNC: 3.7 MMOL/L (ref 3.6–5.5)
PROT SERPL-MCNC: 7.7 G/DL (ref 6–8.2)
PROT UR QL STRIP: NEGATIVE MG/DL
RBC # BLD AUTO: 4.88 M/UL (ref 4.2–5.4)
RBC # URNS HPF: ABNORMAL /HPF
RBC UR QL AUTO: ABNORMAL
SODIUM SERPL-SCNC: 138 MMOL/L (ref 135–145)
SP GR UR STRIP.AUTO: 1.01
UROBILINOGEN UR STRIP.AUTO-MCNC: 0.2 MG/DL
WBC # BLD AUTO: 8.4 K/UL (ref 4.8–10.8)
WBC #/AREA URNS HPF: ABNORMAL /HPF

## 2019-03-08 PROCEDURE — 83690 ASSAY OF LIPASE: CPT

## 2019-03-08 PROCEDURE — 93005 ELECTROCARDIOGRAM TRACING: CPT | Performed by: EMERGENCY MEDICINE

## 2019-03-08 PROCEDURE — 87086 URINE CULTURE/COLONY COUNT: CPT

## 2019-03-08 PROCEDURE — A9270 NON-COVERED ITEM OR SERVICE: HCPCS | Performed by: EMERGENCY MEDICINE

## 2019-03-08 PROCEDURE — 700102 HCHG RX REV CODE 250 W/ 637 OVERRIDE(OP): Performed by: EMERGENCY MEDICINE

## 2019-03-08 PROCEDURE — 83735 ASSAY OF MAGNESIUM: CPT

## 2019-03-08 PROCEDURE — 96365 THER/PROPH/DIAG IV INF INIT: CPT

## 2019-03-08 PROCEDURE — 99219 PR INITIAL OBSERVATION CARE,LEVL II: CPT | Performed by: INTERNAL MEDICINE

## 2019-03-08 PROCEDURE — 74176 CT ABD & PELVIS W/O CONTRAST: CPT

## 2019-03-08 PROCEDURE — 87150 DNA/RNA AMPLIFIED PROBE: CPT | Mod: 91

## 2019-03-08 PROCEDURE — 87040 BLOOD CULTURE FOR BACTERIA: CPT

## 2019-03-08 PROCEDURE — 80053 COMPREHEN METABOLIC PANEL: CPT

## 2019-03-08 PROCEDURE — 99285 EMERGENCY DEPT VISIT HI MDM: CPT

## 2019-03-08 PROCEDURE — 87077 CULTURE AEROBIC IDENTIFY: CPT

## 2019-03-08 PROCEDURE — 36415 COLL VENOUS BLD VENIPUNCTURE: CPT

## 2019-03-08 PROCEDURE — 700111 HCHG RX REV CODE 636 W/ 250 OVERRIDE (IP): Performed by: EMERGENCY MEDICINE

## 2019-03-08 PROCEDURE — 81001 URINALYSIS AUTO W/SCOPE: CPT

## 2019-03-08 PROCEDURE — G0378 HOSPITAL OBSERVATION PER HR: HCPCS

## 2019-03-08 PROCEDURE — 700105 HCHG RX REV CODE 258: Performed by: EMERGENCY MEDICINE

## 2019-03-08 PROCEDURE — 85025 COMPLETE CBC W/AUTO DIFF WBC: CPT

## 2019-03-08 PROCEDURE — 84484 ASSAY OF TROPONIN QUANT: CPT

## 2019-03-08 RX ORDER — OXYCODONE HYDROCHLORIDE 5 MG/1
5 TABLET ORAL
Status: DISCONTINUED | OUTPATIENT
Start: 2019-03-08 | End: 2019-03-09 | Stop reason: HOSPADM

## 2019-03-08 RX ORDER — SODIUM CHLORIDE 9 MG/ML
INJECTION, SOLUTION INTRAVENOUS CONTINUOUS
Status: DISCONTINUED | OUTPATIENT
Start: 2019-03-09 | End: 2019-03-09 | Stop reason: HOSPADM

## 2019-03-08 RX ORDER — ONDANSETRON 2 MG/ML
4 INJECTION INTRAMUSCULAR; INTRAVENOUS EVERY 4 HOURS PRN
Status: DISCONTINUED | OUTPATIENT
Start: 2019-03-08 | End: 2019-03-09 | Stop reason: HOSPADM

## 2019-03-08 RX ORDER — AMOXICILLIN 250 MG
2 CAPSULE ORAL 2 TIMES DAILY
Status: DISCONTINUED | OUTPATIENT
Start: 2019-03-09 | End: 2019-03-09 | Stop reason: HOSPADM

## 2019-03-08 RX ORDER — IBUPROFEN 400 MG/1
400 TABLET ORAL EVERY 6 HOURS PRN
Qty: 30 TAB | Refills: 0 | Status: SHIPPED | OUTPATIENT
Start: 2019-03-08 | End: 2019-03-08

## 2019-03-08 RX ORDER — MORPHINE SULFATE 4 MG/ML
2 INJECTION, SOLUTION INTRAMUSCULAR; INTRAVENOUS
Status: DISCONTINUED | OUTPATIENT
Start: 2019-03-08 | End: 2019-03-09 | Stop reason: HOSPADM

## 2019-03-08 RX ORDER — TRAMADOL HYDROCHLORIDE 50 MG/1
50 TABLET ORAL EVERY 4 HOURS PRN
Qty: 12 TAB | Refills: 0 | Status: SHIPPED | OUTPATIENT
Start: 2019-03-08 | End: 2019-03-09

## 2019-03-08 RX ORDER — HYDROCODONE BITARTRATE AND ACETAMINOPHEN 5; 325 MG/1; MG/1
1 TABLET ORAL ONCE
Status: COMPLETED | OUTPATIENT
Start: 2019-03-08 | End: 2019-03-08

## 2019-03-08 RX ORDER — ONDANSETRON 4 MG/1
4 TABLET, ORALLY DISINTEGRATING ORAL EVERY 4 HOURS PRN
Status: DISCONTINUED | OUTPATIENT
Start: 2019-03-08 | End: 2019-03-09 | Stop reason: HOSPADM

## 2019-03-08 RX ORDER — LISINOPRIL 20 MG/1
20 TABLET ORAL DAILY
Status: DISCONTINUED | OUTPATIENT
Start: 2019-03-09 | End: 2019-03-09 | Stop reason: HOSPADM

## 2019-03-08 RX ORDER — OXYCODONE HYDROCHLORIDE 5 MG/1
2.5 TABLET ORAL
Status: DISCONTINUED | OUTPATIENT
Start: 2019-03-08 | End: 2019-03-09 | Stop reason: HOSPADM

## 2019-03-08 RX ORDER — POLYETHYLENE GLYCOL 3350 17 G/17G
1 POWDER, FOR SOLUTION ORAL
Status: DISCONTINUED | OUTPATIENT
Start: 2019-03-08 | End: 2019-03-09 | Stop reason: HOSPADM

## 2019-03-08 RX ORDER — LISINOPRIL 20 MG/1
20 TABLET ORAL DAILY
COMMUNITY

## 2019-03-08 RX ORDER — ACETAMINOPHEN 325 MG/1
650 TABLET ORAL EVERY 6 HOURS PRN
Status: DISCONTINUED | OUTPATIENT
Start: 2019-03-08 | End: 2019-03-09 | Stop reason: HOSPADM

## 2019-03-08 RX ORDER — BISACODYL 10 MG
10 SUPPOSITORY, RECTAL RECTAL
Status: DISCONTINUED | OUTPATIENT
Start: 2019-03-08 | End: 2019-03-09 | Stop reason: HOSPADM

## 2019-03-08 RX ADMIN — HYDROCODONE BITARTRATE AND ACETAMINOPHEN 1 TABLET: 5; 325 TABLET ORAL at 22:15

## 2019-03-08 RX ADMIN — CEFTRIAXONE SODIUM 1 G: 1 INJECTION, POWDER, FOR SOLUTION INTRAMUSCULAR; INTRAVENOUS at 22:15

## 2019-03-08 ASSESSMENT — ENCOUNTER SYMPTOMS
BLOOD IN STOOL: 0
HEADACHES: 0
CHILLS: 0
DIARRHEA: 0
PALPITATIONS: 0
FEVER: 0
DIZZINESS: 0
TINGLING: 0
NAUSEA: 0
ABDOMINAL PAIN: 1
CONSTIPATION: 0
VOMITING: 0
MYALGIAS: 0

## 2019-03-08 ASSESSMENT — PAIN DESCRIPTION - DESCRIPTORS: DESCRIPTORS: SHARP;STABBING

## 2019-03-09 ENCOUNTER — PATIENT OUTREACH (OUTPATIENT)
Dept: HEALTH INFORMATION MANAGEMENT | Facility: OTHER | Age: 54
End: 2019-03-09

## 2019-03-09 VITALS
WEIGHT: 160.5 LBS | HEIGHT: 60 IN | RESPIRATION RATE: 17 BRPM | DIASTOLIC BLOOD PRESSURE: 76 MMHG | BODY MASS INDEX: 31.51 KG/M2 | HEART RATE: 61 BPM | SYSTOLIC BLOOD PRESSURE: 145 MMHG | OXYGEN SATURATION: 92 % | TEMPERATURE: 98.7 F

## 2019-03-09 PROBLEM — N20.0 STAGHORN RENAL CALCULUS: Status: ACTIVE | Noted: 2019-03-09

## 2019-03-09 PROBLEM — M94.0 COSTOCHONDRITIS: Status: ACTIVE | Noted: 2019-03-09

## 2019-03-09 LAB
ANION GAP SERPL CALC-SCNC: 8 MMOL/L (ref 0–11.9)
BASOPHILS # BLD AUTO: 0.7 % (ref 0–1.8)
BASOPHILS # BLD: 0.05 K/UL (ref 0–0.12)
BUN SERPL-MCNC: 17 MG/DL (ref 8–22)
CALCIUM SERPL-MCNC: 9.3 MG/DL (ref 8.5–10.5)
CHLORIDE SERPL-SCNC: 109 MMOL/L (ref 96–112)
CO2 SERPL-SCNC: 24 MMOL/L (ref 20–33)
CREAT SERPL-MCNC: 0.7 MG/DL (ref 0.5–1.4)
EOSINOPHIL # BLD AUTO: 0.32 K/UL (ref 0–0.51)
EOSINOPHIL NFR BLD: 4.2 % (ref 0–6.9)
ERYTHROCYTE [DISTWIDTH] IN BLOOD BY AUTOMATED COUNT: 39.2 FL (ref 35.9–50)
GLUCOSE SERPL-MCNC: 113 MG/DL (ref 65–99)
HCT VFR BLD AUTO: 41.6 % (ref 37–47)
HGB BLD-MCNC: 13.9 G/DL (ref 12–16)
IMM GRANULOCYTES # BLD AUTO: 0.05 K/UL (ref 0–0.11)
IMM GRANULOCYTES NFR BLD AUTO: 0.7 % (ref 0–0.9)
LYMPHOCYTES # BLD AUTO: 3.17 K/UL (ref 1–4.8)
LYMPHOCYTES NFR BLD: 41.2 % (ref 22–41)
MAGNESIUM SERPL-MCNC: 2.1 MG/DL (ref 1.5–2.5)
MCH RBC QN AUTO: 29.6 PG (ref 27–33)
MCHC RBC AUTO-ENTMCNC: 33.4 G/DL (ref 33.6–35)
MCV RBC AUTO: 88.7 FL (ref 81.4–97.8)
MONOCYTES # BLD AUTO: 0.57 K/UL (ref 0–0.85)
MONOCYTES NFR BLD AUTO: 7.4 % (ref 0–13.4)
NEUTROPHILS # BLD AUTO: 3.53 K/UL (ref 2–7.15)
NEUTROPHILS NFR BLD: 45.8 % (ref 44–72)
NRBC # BLD AUTO: 0 K/UL
NRBC BLD-RTO: 0 /100 WBC
PLATELET # BLD AUTO: 243 K/UL (ref 164–446)
PMV BLD AUTO: 10.4 FL (ref 9–12.9)
POTASSIUM SERPL-SCNC: 4.1 MMOL/L (ref 3.6–5.5)
RBC # BLD AUTO: 4.69 M/UL (ref 4.2–5.4)
SODIUM SERPL-SCNC: 141 MMOL/L (ref 135–145)
TROPONIN I SERPL-MCNC: <0.01 NG/ML (ref 0–0.04)
WBC # BLD AUTO: 7.7 K/UL (ref 4.8–10.8)

## 2019-03-09 PROCEDURE — 36415 COLL VENOUS BLD VENIPUNCTURE: CPT

## 2019-03-09 PROCEDURE — A9270 NON-COVERED ITEM OR SERVICE: HCPCS | Performed by: INTERNAL MEDICINE

## 2019-03-09 PROCEDURE — 700102 HCHG RX REV CODE 250 W/ 637 OVERRIDE(OP): Performed by: INTERNAL MEDICINE

## 2019-03-09 PROCEDURE — G0378 HOSPITAL OBSERVATION PER HR: HCPCS

## 2019-03-09 PROCEDURE — 700105 HCHG RX REV CODE 258: Performed by: INTERNAL MEDICINE

## 2019-03-09 PROCEDURE — 96366 THER/PROPH/DIAG IV INF ADDON: CPT

## 2019-03-09 PROCEDURE — 700111 HCHG RX REV CODE 636 W/ 250 OVERRIDE (IP): Performed by: INTERNAL MEDICINE

## 2019-03-09 PROCEDURE — 80048 BASIC METABOLIC PNL TOTAL CA: CPT

## 2019-03-09 PROCEDURE — 700111 HCHG RX REV CODE 636 W/ 250 OVERRIDE (IP): Performed by: NURSE PRACTITIONER

## 2019-03-09 PROCEDURE — 99217 PR OBSERVATION CARE DISCHARGE: CPT | Performed by: INTERNAL MEDICINE

## 2019-03-09 PROCEDURE — 85025 COMPLETE CBC W/AUTO DIFF WBC: CPT

## 2019-03-09 PROCEDURE — 96375 TX/PRO/DX INJ NEW DRUG ADDON: CPT

## 2019-03-09 RX ORDER — TRAMADOL HYDROCHLORIDE 50 MG/1
50 TABLET ORAL EVERY 4 HOURS PRN
Qty: 12 TAB | Refills: 0 | Status: SHIPPED | OUTPATIENT
Start: 2019-03-09 | End: 2019-03-19

## 2019-03-09 RX ORDER — ZINC OXIDE 13 %
1 CREAM (GRAM) TOPICAL DAILY
COMMUNITY
Start: 2019-03-09 | End: 2019-04-26

## 2019-03-09 RX ORDER — IBUPROFEN 600 MG/1
600 TABLET ORAL EVERY 6 HOURS PRN
Status: DISCONTINUED | OUTPATIENT
Start: 2019-03-09 | End: 2019-03-09 | Stop reason: HOSPADM

## 2019-03-09 RX ORDER — KETOROLAC TROMETHAMINE 30 MG/ML
30 INJECTION, SOLUTION INTRAMUSCULAR; INTRAVENOUS EVERY 6 HOURS PRN
Status: DISCONTINUED | OUTPATIENT
Start: 2019-03-09 | End: 2019-03-09 | Stop reason: HOSPADM

## 2019-03-09 RX ORDER — CIPROFLOXACIN 500 MG/1
500 TABLET, FILM COATED ORAL 2 TIMES DAILY
Qty: 28 TAB | Refills: 0 | Status: SHIPPED | OUTPATIENT
Start: 2019-03-09 | End: 2019-03-23

## 2019-03-09 RX ADMIN — CEFTRIAXONE SODIUM 2 G: 2 INJECTION, POWDER, FOR SOLUTION INTRAMUSCULAR; INTRAVENOUS at 06:25

## 2019-03-09 RX ADMIN — OXYCODONE HYDROCHLORIDE 5 MG: 5 TABLET ORAL at 01:39

## 2019-03-09 RX ADMIN — KETOROLAC TROMETHAMINE 30 MG: 30 INJECTION, SOLUTION INTRAMUSCULAR; INTRAVENOUS at 09:56

## 2019-03-09 RX ADMIN — LISINOPRIL 20 MG: 20 TABLET ORAL at 06:25

## 2019-03-09 RX ADMIN — SODIUM CHLORIDE: 9 INJECTION, SOLUTION INTRAVENOUS at 01:34

## 2019-03-09 ASSESSMENT — ENCOUNTER SYMPTOMS
WHEEZING: 0
PALPITATIONS: 0
NAUSEA: 0
BACK PAIN: 0
ABDOMINAL PAIN: 1
BRUISES/BLEEDS EASILY: 0
BLURRED VISION: 0
BLOOD IN STOOL: 0
MYALGIAS: 0
SPUTUM PRODUCTION: 0
DIZZINESS: 0
CHILLS: 0
NECK PAIN: 0
DIAPHORESIS: 0
VOMITING: 0
SORE THROAT: 0
DIARRHEA: 0
HEADACHES: 0
COUGH: 0
FEVER: 0
SHORTNESS OF BREATH: 0
SEIZURES: 0
FLANK PAIN: 1
FOCAL WEAKNESS: 0

## 2019-03-09 ASSESSMENT — PATIENT HEALTH QUESTIONNAIRE - PHQ9
1. LITTLE INTEREST OR PLEASURE IN DOING THINGS: NOT AT ALL
2. FEELING DOWN, DEPRESSED, IRRITABLE, OR HOPELESS: NOT AT ALL
SUM OF ALL RESPONSES TO PHQ9 QUESTIONS 1 AND 2: 0
1. LITTLE INTEREST OR PLEASURE IN DOING THINGS: NOT AT ALL
2. FEELING DOWN, DEPRESSED, IRRITABLE, OR HOPELESS: NOT AT ALL
SUM OF ALL RESPONSES TO PHQ9 QUESTIONS 1 AND 2: 0

## 2019-03-09 ASSESSMENT — PAIN SCALES - WONG BAKER
WONGBAKER_NUMERICALRESPONSE: HURTS A WHOLE LOT
WONGBAKER_NUMERICALRESPONSE: DOESN'T HURT AT ALL

## 2019-03-09 ASSESSMENT — LIFESTYLE VARIABLES
EVER_SMOKED: NEVER
ALCOHOL_USE: NO

## 2019-03-09 NOTE — ED TRIAGE NOTES
Martha Reyes-Martinez  54 y.o. female  Chief Complaint   Patient presents with   • Flank Pain     Pt. states right sided flank pain that is sharp, stabbing pain in her right flank x 2 days. Pt. states she has a hx of chronic kidney stones. Pt. denies any N/V. Pt. does states frequent urination.      /87   Pulse 70   Temp 36.1 °C (97 °F) (Temporal)   Resp 17   Ht 1.524 m (5')   Wt 73.8 kg (162 lb 11.2 oz)   SpO2 97%   BMI 31.78 kg/m²     Pt amb to triage with steady gait for above complaint.   Pt is alert and oriented, speaking in full sentences, follows commands and responds appropriately to questions. NAD. Resp are even and unlabored.  Pt placed in lobby. Pt educated on triage process. Pt encouraged to alert staff for any changes.

## 2019-03-09 NOTE — DISCHARGE INSTRUCTIONS
Discharge Instructions    Discharged to home by car with relative. Discharged via wheelchair, hospital escort: Yes.  Special equipment needed: Not Applicable    Be sure to schedule a follow-up appointment with your primary care doctor or any specialists as instructed.     Discharge Plan:   Diet Plan: Discussed  Activity Level: Discussed  Confirmed Follow up Appointment: Patient to Call and Schedule Appointment  Confirmed Symptoms Management: Discussed  Medication Reconciliation Updated: Yes  Influenza Vaccine Indication: Not indicated: Previously immunized this influenza season and > 8 years of age    I understand that a diet low in cholesterol, fat, and sodium is recommended for good health. Unless I have been given specific instructions below for another diet, I accept this instruction as my diet prescription.   Other diet:     Special Instructions: None    · Is patient discharged on Warfarin / Coumadin?   No     Depression / Suicide Risk    As you are discharged from this [facility] facility, it is important to learn how to keep safe from harming yourself.    Recognize the warning signs:  · Abrupt changes in personality, positive or negative- including increase in energy   · Giving away possessions  · Change in eating patterns- significant weight changes-  positive or negative  · Change in sleeping patterns- unable to sleep or sleeping all the time   · Unwillingness or inability to communicate  · Depression  · Unusual sadness, discouragement and loneliness  · Talk of wanting to die  · Neglect of personal appearance   · Rebelliousness- reckless behavior  · Withdrawal from people/activities they love  · Confusion- inability to concentrate     If you or a loved one observes any of these behaviors or has concerns about self-harm, here's what you can do:  · Talk about it- your feelings and reasons for harming yourself  · Remove any means that you might use to hurt yourself (examples: pills, rope, extension cords,  firearm)  · Get professional help from the community (Mental Health, Substance Abuse, psychological counseling)  · Do not be alone:Call your Safe Contact- someone whom you trust who will be there for you.  · Call your local CRISIS HOTLINE 393-1302 or 293-958-1252  · Call your local Children's Mobile Crisis Response Team [place] (315) 534-8797 or www.[employer]  · Call the toll free National Suicide Prevention Hotlines   · National Suicide Prevention Lifeline 006-659-MEWG (3869)  · National Hope Line Network 800-SUICIDE (703-0717)

## 2019-03-09 NOTE — CONSULTS
UROLOGY Consult Note:    Brigida QUIROZ/ Dr Rafael Britt  Date & Time note created:    3/9/2019   9:22 AM     Referring MD:  Dr. Beckford    Patient ID:   Name:             Reyes-Martinez , Martha     YOB: 1965  Age:                 54 y.o.  female   MRN:               [id number]                                                             Reason for Consult:      Left flank pain. Left staghorn calulus    History of Present Illness:    54 y.o. female with a past medical history of hypertension and kidney stones who presented 3/8/2019 with left flank pain for the past 2 days.  Patient reports left flank pain that radiates to her left upper quadrant and is worse with movement of her trunk.  She reports chills but no fevers.  She denies any chest pain, shortness of breath, diaphoresis or nausea.  She denies any dysuria or hematuria. She is a patient of Dr Perez in our office. He has been monitoring her left staghorn calculus. Mag 3 renal scan from October 2018 shows 83% function on right and only 16 % on right. Has previously discussed left nephrectomy with patient and her family.    Review of Systems:        All pertinent positives addressed in HPI   All other systems were reviewed and are negative (AMA/CMS criteria)                Past Medical History:   Past Medical History:   Diagnosis Date   • Hypertension    • Nephrolithiasis      Active Hospital Problems    Diagnosis   • Staghorn renal calculus [N20.0]     Priority: High   • Hypertension [I10]     Priority: Low   • Costochondritis [M94.0]   • Pyelonephritis [N12]       Past Surgical History:  History reviewed. No pertinent surgical history.    Hospital Medications:    Current Facility-Administered Medications:   •  ibuprofen (MOTRIN) tablet 600 mg, 600 mg, Oral, Q6HRS PRN, Hector Beckford M.D.  •  ketorolac (TORADOL) injection 30 mg, 30 mg, Intravenous, Q6HRS PRN, JANE VasquezRTasneemN.  •  senna-docusate (PERICOLACE or SENOKOT S) 8.6-50 MG per  tablet 2 Tab, 2 Tab, Oral, BID **AND** polyethylene glycol/lytes (MIRALAX) PACKET 1 Packet, 1 Packet, Oral, QDAY PRN **AND** magnesium hydroxide (MILK OF MAGNESIA) suspension 30 mL, 30 mL, Oral, QDAY PRN **AND** bisacodyl (DULCOLAX) suppository 10 mg, 10 mg, Rectal, QDAY PRN, Hector Beckford M.D.  •  Respiratory Care per Protocol, , Nebulization, Continuous RT, Hector Beckford M.D.  •  NS infusion, , Intravenous, Continuous, Hector Beckford M.D., Last Rate: 125 mL/hr at 03/09/19 0700  •  acetaminophen (TYLENOL) tablet 650 mg, 650 mg, Oral, Q6HRS PRN, Hector Beckford M.D.  •  Notify provider if pain remains uncontrolled, , , CONTINUOUS **AND** Use the numeric rating scale (NRS-11) on regular floors and Critical-Care Pain Observation Tool (CPOT) on ICUs/Trauma to assess pain, , , CONTINUOUS **AND** Pulse Ox (Oximetry), , , CONTINUOUS **AND** Pharmacy Consult Request ...Pain Management Review 1 Each, 1 Each, Other, PHARMACY TO DOSE **AND** If patient difficult to arouse and/or has respiratory depression, stop any opiates that are currently infusing and call a Rapid Response., , , CONTINUOUS **AND** oxyCODONE immediate-release (ROXICODONE) tablet 2.5 mg, 2.5 mg, Oral, Q3HRS PRN, Stopped at 03/09/19 0138 **AND** oxyCODONE immediate-release (ROXICODONE) tablet 5 mg, 5 mg, Oral, Q3HRS PRN, 5 mg at 03/09/19 0139 **AND** morphine (pf) 4 mg/ml injection 2 mg, 2 mg, Intravenous, Q3HRS PRN, Hector Beckford M.D.  •  ondansetron (ZOFRAN) syringe/vial injection 4 mg, 4 mg, Intravenous, Q4HRS PRN, Hector Beckford M.D.  •  ondansetron (ZOFRAN ODT) dispertab 4 mg, 4 mg, Oral, Q4HRS PRN, Hector Beckford M.D.  •  cefTRIAXone (ROCEPHIN) 2 g in  mL IVPB, 2 g, Intravenous, Q24HRS, Hector Beckford M.D., Stopped at 03/09/19 0655  •  lisinopril (PRINIVIL) tablet 20 mg, 20 mg, Oral, DAILY, Hector Beckford M.D., 20 mg at 03/09/19 0625    Current Outpatient Medications:  Prescriptions Prior to Admission   Medication Sig Dispense Refill Last Dose   • lisinopril  (PRINIVIL) 20 MG Tab Take 20 mg by mouth every day.   3/8/2019 at AMcalcium    • Calcium Carbonate-Vitamin D (CALCIUM 600+D) 600-200 MG-UNIT Tab Take 1 Tab by mouth every day.   3/8/2019 at AM       Medication Allergy:  No Known Allergies    Family History:  History reviewed. No pertinent family history.    Social History:  Social History     Social History   • Marital status:      Spouse name: N/A   • Number of children: N/A   • Years of education: N/A     Occupational History   • Not on file.     Social History Main Topics   • Smoking status: Never Smoker   • Smokeless tobacco: Never Used   • Alcohol use No   • Drug use: No   • Sexual activity: Not on file     Other Topics Concern   • Not on file     Social History Narrative   • No narrative on file         Physical Exam:  Vitals/ General Appearance:   Weight/BMI: Body mass index is 31.34 kg/m².  Blood pressure 145/76, pulse 61, temperature 37.1 °C (98.7 °F), temperature source Temporal, resp. rate 17, height 1.524 m (5'), weight 72.8 kg (160 lb 7.9 oz), SpO2 92 %, not currently breastfeeding.  Vitals:    03/09/19 0030 03/09/19 0116 03/09/19 0400 03/09/19 0857   BP:  160/84 151/83 145/76   Pulse: 70 72 66 61   Resp:  16 17 17   Temp:  36.4 °C (97.5 °F) 36.4 °C (97.5 °F) 37.1 °C (98.7 °F)   TempSrc:  Temporal Temporal Temporal   SpO2: 92% 93% 97% 92%   Weight:  72.8 kg (160 lb 7.9 oz)     Height:  1.524 m (5')       Oxygen Therapy:  Pulse Oximetry: 92 %, O2 (LPM): 0, O2 Delivery: None (Room Air)    Constitutional:   Well developed, Well nourished, No acute distress  HENMT:  Normocephalic, Atraumatic  Eyes:  EOMI, Conjunctiva normal, No discharge.  Neck:  Normal range of motion  Cardiovascular:  Normal heart rate  Lungs:  Normal breath sounds   Abdomen: Bowel sounds normal. Abdominal pain  : Flank pain  Skin: Warm, Dry, No erythema, No rash, no induration.  Neurologic: Alert & oriented x 3, No focal deficits noted, cranial nerves II through X are grossly  intact.  Psychiatric: Affect normal, Judgment normal, Mood normal.      MDM (Data Review):     Records reviewed and summarized in current documentation    Lab Data Review:  Recent Results (from the past 24 hour(s))   CBC WITH DIFFERENTIAL    Collection Time: 03/08/19  8:28 PM   Result Value Ref Range    WBC 8.4 4.8 - 10.8 K/uL    RBC 4.88 4.20 - 5.40 M/uL    Hemoglobin 14.6 12.0 - 16.0 g/dL    Hematocrit 42.0 37.0 - 47.0 %    MCV 86.1 81.4 - 97.8 fL    MCH 29.9 27.0 - 33.0 pg    MCHC 34.8 33.6 - 35.0 g/dL    RDW 37.4 35.9 - 50.0 fL    Platelet Count 262 164 - 446 K/uL    MPV 10.2 9.0 - 12.9 fL    Neutrophils-Polys 49.30 44.00 - 72.00 %    Lymphocytes 38.00 22.00 - 41.00 %    Monocytes 7.30 0.00 - 13.40 %    Eosinophils 4.10 0.00 - 6.90 %    Basophils 0.70 0.00 - 1.80 %    Immature Granulocytes 0.60 0.00 - 0.90 %    Nucleated RBC 0.00 /100 WBC    Neutrophils (Absolute) 4.15 2.00 - 7.15 K/uL    Lymphs (Absolute) 3.21 1.00 - 4.80 K/uL    Monos (Absolute) 0.62 0.00 - 0.85 K/uL    Eos (Absolute) 0.35 0.00 - 0.51 K/uL    Baso (Absolute) 0.06 0.00 - 0.12 K/uL    Immature Granulocytes (abs) 0.05 0.00 - 0.11 K/uL    NRBC (Absolute) 0.00 K/uL   COMP METABOLIC PANEL    Collection Time: 03/08/19  8:28 PM   Result Value Ref Range    Sodium 138 135 - 145 mmol/L    Potassium 3.7 3.6 - 5.5 mmol/L    Chloride 106 96 - 112 mmol/L    Co2 24 20 - 33 mmol/L    Anion Gap 8.0 0.0 - 11.9    Glucose 130 (H) 65 - 99 mg/dL    Bun 15 8 - 22 mg/dL    Creatinine 0.66 0.50 - 1.40 mg/dL    Calcium 10.3 8.5 - 10.5 mg/dL    AST(SGOT) 36 12 - 45 U/L    ALT(SGPT) 59 (H) 2 - 50 U/L    Alkaline Phosphatase 97 30 - 99 U/L    Total Bilirubin 0.4 0.1 - 1.5 mg/dL    Albumin 4.3 3.2 - 4.9 g/dL    Total Protein 7.7 6.0 - 8.2 g/dL    Globulin 3.4 1.9 - 3.5 g/dL    A-G Ratio 1.3 g/dL   LIPASE    Collection Time: 03/08/19  8:28 PM   Result Value Ref Range    Lipase 41 11 - 82 U/L   URINALYSIS,CULTURE IF INDICATED    Collection Time: 03/08/19  8:28 PM   Result  Value Ref Range    Color Yellow     Character Clear     Specific Gravity 1.013 <1.035    Ph 6.0 5.0 - 8.0    Glucose Negative Negative mg/dL    Ketones Negative Negative mg/dL    Protein Negative Negative mg/dL    Bilirubin Negative Negative    Urobilinogen, Urine 0.2 Negative    Nitrite Negative Negative    Leukocyte Esterase Large (A) Negative    Occult Blood Small (A) Negative    Micro Urine Req Microscopic    URINE MICROSCOPIC (W/UA)    Collection Time: 19  8:28 PM   Result Value Ref Range    WBC  (A) /hpf    RBC 2-5 (A) /hpf    Bacteria Negative None /hpf    Epithelial Cells Negative /hpf    Hyaline Cast 0-2 /lpf   ESTIMATED GFR    Collection Time: 19  8:28 PM   Result Value Ref Range    GFR If African American >60 >60 mL/min/1.73 m 2    GFR If Non African American >60 >60 mL/min/1.73 m 2   TROPONIN    Collection Time: 19  8:28 PM   Result Value Ref Range    Troponin I <0.01 0.00 - 0.04 ng/mL   Magnesium    Collection Time: 19  8:28 PM   Result Value Ref Range    Magnesium 2.1 1.5 - 2.5 mg/dL   EKG    Collection Time: 19  8:35 PM   Result Value Ref Range    Report       [facility] Emergency Dept.    Test Date:  2019  Pt Name:    MARTHA REYES-MARTINEZ        Department: ER  MRN:        [id number]                      Room:       [facility]  Gender:     Female                       Technician: [id number]  :        1965                   Requested By:DANNY STAFFORD  Order #:    [id number]                    Reading MD: Kelsey Hall MD    Measurements  Intervals                                Axis  Rate:       68                           P:          53  WI:         208                          QRS:        40  QRSD:       84                           T:          52  QT:         404  QTc:        430    Interpretive Statements  EKG is normal sinus rhythm normal axis normal intervals no ST changes  consistent  with acute regional ischemia  Electronically  Signed On 3-8-2019 21:44:25 PST by Kelsey Hall MD     CBC with Differential    Collection Time: 03/09/19  4:17 AM   Result Value Ref Range    WBC 7.7 4.8 - 10.8 K/uL    RBC 4.69 4.20 - 5.40 M/uL    Hemoglobin 13.9 12.0 - 16.0 g/dL    Hematocrit 41.6 37.0 - 47.0 %    MCV 88.7 81.4 - 97.8 fL    MCH 29.6 27.0 - 33.0 pg    MCHC 33.4 (L) 33.6 - 35.0 g/dL    RDW 39.2 35.9 - 50.0 fL    Platelet Count 243 164 - 446 K/uL    MPV 10.4 9.0 - 12.9 fL    Neutrophils-Polys 45.80 44.00 - 72.00 %    Lymphocytes 41.20 (H) 22.00 - 41.00 %    Monocytes 7.40 0.00 - 13.40 %    Eosinophils 4.20 0.00 - 6.90 %    Basophils 0.70 0.00 - 1.80 %    Immature Granulocytes 0.70 0.00 - 0.90 %    Nucleated RBC 0.00 /100 WBC    Neutrophils (Absolute) 3.53 2.00 - 7.15 K/uL    Lymphs (Absolute) 3.17 1.00 - 4.80 K/uL    Monos (Absolute) 0.57 0.00 - 0.85 K/uL    Eos (Absolute) 0.32 0.00 - 0.51 K/uL    Baso (Absolute) 0.05 0.00 - 0.12 K/uL    Immature Granulocytes (abs) 0.05 0.00 - 0.11 K/uL    NRBC (Absolute) 0.00 K/uL   Basic Metabolic Panel (BMP)    Collection Time: 03/09/19  4:17 AM   Result Value Ref Range    Sodium 141 135 - 145 mmol/L    Potassium 4.1 3.6 - 5.5 mmol/L    Chloride 109 96 - 112 mmol/L    Co2 24 20 - 33 mmol/L    Glucose 113 (H) 65 - 99 mg/dL    Bun 17 8 - 22 mg/dL    Creatinine 0.70 0.50 - 1.40 mg/dL    Calcium 9.3 8.5 - 10.5 mg/dL    Anion Gap 8.0 0.0 - 11.9   ESTIMATED GFR    Collection Time: 03/09/19  4:17 AM   Result Value Ref Range    GFR If African American >60 >60 mL/min/1.73 m 2    GFR If Non African American >60 >60 mL/min/1.73 m 2       Imaging/Procedures Review:    Reviewed    MDM (Assessment and Plan):     Active Hospital Problems    Diagnosis   • Staghorn renal calculus [N20.0]     Priority: High   • Hypertension [I10]     Priority: Low   • Costochondritis [M94.0]   • Pyelonephritis [N12]     Large left staghorn calculus  UTI-ceftriaxone  Very minimal function in left atrophic kidney  Is being monitored as OP by   Ana  Plan is to treat her UTI and then f/u in office with Dr Perez in one week to again discuss left nephrectomy  No further urology intervention needed at this time

## 2019-03-09 NOTE — ASSESSMENT & PLAN NOTE
-CT scan showed large left staghorn calculus with calyceal dilatation  -IV ABX  -Afebrile.  No leukocytosis.  -continue IVF hydration  -Urology consult pending  -pain control. I added Toradol prn

## 2019-03-09 NOTE — H&P
Hospital Medicine History & Physical Note    Date of Service  3/8/2019    Primary Care Physician  MYCHAL Collier    Consultants  Urology    Code Status  Full code    Chief Complaint  Left flank pain    History of Presenting Illness  54 y.o. female with a past medical history of hypertension and kidney stones who presented 3/8/2019 with left flank pain for the past 2 days.  Patient reports left flank pain that radiates to her left upper quadrant and is worse with movement of her trunk.  She reports chills but no fevers.  She denies any chest pain, shortness of breath, diaphoresis or nausea.  She denies any dysuria or hematuria.    EKG interpreted by me reveals sinus rhythm with no ST elevation or ST depression    Review of Systems  Review of Systems   Constitutional: Negative for chills, diaphoresis and fever.   HENT: Negative for hearing loss and sore throat.    Eyes: Negative for blurred vision.   Respiratory: Negative for cough, sputum production, shortness of breath and wheezing.    Cardiovascular: Negative for chest pain, palpitations and leg swelling.   Gastrointestinal: Positive for abdominal pain. Negative for blood in stool, diarrhea, nausea and vomiting.   Genitourinary: Positive for flank pain. Negative for dysuria and urgency.   Musculoskeletal: Negative for back pain, joint pain, myalgias and neck pain.   Skin: Negative for rash.   Neurological: Negative for dizziness, focal weakness, seizures and headaches.   Endo/Heme/Allergies: Does not bruise/bleed easily.   Psychiatric/Behavioral: Negative for suicidal ideas.   All other systems reviewed and are negative.      Past Medical History   has a past medical history of Hypertension and Nephrolithiasis. She also has no past medical history of Breast cancer (HCC).    Surgical History  No pertinent surgical history    Family History  No pertinent family history    Social History   reports that she has never smoked. She has never used smokeless  tobacco. She reports that she does not drink alcohol or use drugs.    Allergies  No Known Allergies    Medications  Prior to Admission Medications   Prescriptions Last Dose Informant Patient Reported? Taking?   Calcium Carbonate-Vitamin D (CALCIUM 600+D) 600-200 MG-UNIT Tab 3/8/2019 at AM Rx Bottle (For Med Information) Yes Yes   Sig: Take 1 Tab by mouth every day.   lisinopril (PRINIVIL) 20 MG Tab 3/8/2019 at AMcalcium  Rx Bottle (For Med Information) Yes Yes   Sig: Take 20 mg by mouth every day.      Facility-Administered Medications: None       Physical Exam  Temp:  [36.1 °C (97 °F)-36.4 °C (97.5 °F)] 36.4 °C (97.5 °F)  Pulse:  [66-78] 66  Resp:  [16-17] 17  BP: (146-160)/(73-87) 151/83  SpO2:  [92 %-97 %] 97 %    Physical Exam   Constitutional: She is oriented to person, place, and time. She appears well-developed and well-nourished. No distress.   HENT:   Head: Normocephalic and atraumatic.   Mouth/Throat: Oropharynx is clear and moist.   Eyes: Pupils are equal, round, and reactive to light. Conjunctivae are normal.   Neck: Neck supple. No thyromegaly present.   Cardiovascular: Normal rate, regular rhythm and normal heart sounds.    Pulmonary/Chest: Effort normal and breath sounds normal. No respiratory distress. She has no wheezes. She has no rales. She exhibits tenderness (Left lower chest wall tenderness to palpation).   Abdominal: Soft. Bowel sounds are normal. She exhibits no distension. There is tenderness. There is no rebound.   Left CVA tenderness   Musculoskeletal: Normal range of motion. She exhibits no edema or tenderness.   Neurological: She is alert and oriented to person, place, and time. No cranial nerve deficit. Coordination normal.   Skin: Skin is warm and dry.   Psychiatric: She has a normal mood and affect. Her behavior is normal.   Nursing note and vitals reviewed.      Laboratory:  Recent Labs      03/08/19 2028   WBC  8.4   RBC  4.88   HEMOGLOBIN  14.6   HEMATOCRIT  42.0   MCV  86.1    MCH  29.9   MCHC  34.8   RDW  37.4   PLATELETCT  262   MPV  10.2     Recent Labs      03/08/19 2028   SODIUM  138   POTASSIUM  3.7   CHLORIDE  106   CO2  24   GLUCOSE  130*   BUN  15   CREATININE  0.66   CALCIUM  10.3     Recent Labs      03/08/19 2028   ALTSGPT  59*   ASTSGOT  36   ALKPHOSPHAT  97   TBILIRUBIN  0.4   LIPASE  41   GLUCOSE  130*                 Recent Labs      03/08/19 2028   TROPONINI  <0.01       Urinalysis:    Recent Labs      03/08/19 2028   SPECGRAVITY  1.013   GLUCOSEUR  Negative   KETONES  Negative   NITRITE  Negative   LEUKESTERAS  Large*   WBCURINE  *   RBCURINE  2-5*   BACTERIA  Negative   EPITHELCELL  Negative        Imaging:  CT-ABDOMEN-PELVIS W/O   Final Result         1.  Hazy fat stranding posterior to the cecum, nonspecific inflammatory changes of uncertain etiology, may represent minimal residual changes related to prior large retroperitoneal and right pelvic hematoma.   2.  Large left staghorn calculus with calyceal dilatation, stable since prior study.   3.  Hepatomegaly and diffuse hepatic steatosis   4.  Cholelithiasis   5.  3 mm right middle lobe nodular density, see nodule follow-up recommendations below.      Low Risk: No routine follow-up      High Risk: Optional CT at 12 months      Comments: Nodules less than 6 mm do not require routine follow-up, but certain patients at high risk with suspicious nodule morphology, upper lobe location, or both may warrant 12-month follow-up.      Low Risk - Minimal or absent history of smoking and of other known risk factors.      High Risk - History of smoking or of other known risk factors.      Note: These recommendations do not apply to lung cancer screening, patients with immunosuppression, or patients with known primary cancer.      Fleischner Society 2017 Guidelines for Management of Incidentally Detected Pulmonary Nodules in Adults               Assessment/Plan:  I anticipate this patient is appropriate for  observation status at this time.    Staghorn renal calculus- (present on admission)   Assessment & Plan    Large infected left staghorn calculus  Patient has been started on IV ceftriaxone   IV fluid hydration with normal saline  Urology has been consulted  Pain control with Tylenol and oxycodone.  IV morphine for breakthrough, monitor respiratory status       Pyelonephritis- (present on admission)   Assessment & Plan    Started on IV ceftriaxone  Follow urine culture     Hypertension- (present on admission)   Assessment & Plan    Continue lisinopril         VTE prophylaxis: scd

## 2019-03-09 NOTE — CARE PLAN
Problem: Communication  Goal: The ability to communicate needs accurately and effectively will improve  Outcome: PROGRESSING AS EXPECTED  Use language line/ request assistance from staff members to interpret.    Problem: Safety  Goal: Will remain free from injury  Outcome: PROGRESSING AS EXPECTED  Anticipate patient's needs. Keep belongings within reach.    Problem: Pain Management  Goal: Pain level will decrease to patient's comfort goal  Outcome: PROGRESSING AS EXPECTED  Administer prescribed pain meds.

## 2019-03-09 NOTE — PROGRESS NOTES
Pt visited by urology PA,no surgery planned,food served,pt planned for d/c.Updated the plan to pt.

## 2019-03-09 NOTE — PROGRESS NOTES
Received from yellow  pod, aox4,  steady on her  feet. With  left  flank pain  8/10  denies  sob. Call light within reach. Needs attended. Plan of care discussed and understood.

## 2019-03-09 NOTE — ED NOTES
Med Rec Updated and Complete Pts family at bedside translating for Pt and RX bottles (returned)  Allergies Reviewed   NO PO ABX last 30 days.

## 2019-03-09 NOTE — PROGRESS NOTES
Pt awake,a&ox4,pt have chronic pain,not in  Distress,daughter at bedside,poc explained,awaiting for urologist,on iv abx.kept npo  For possible surgery plan.

## 2019-03-09 NOTE — DISCHARGE SUMMARY
Discharge Summary    CHIEF COMPLAINT ON ADMISSION  Chief Complaint   Patient presents with   • Flank Pain     Pt. states right sided flank pain that is sharp, stabbing pain in her right flank x 2 days. Pt. states she has a hx of chronic kidney stones. Pt. denies any N/V. Pt. does states frequent urination.        Reason for Admission  Back Pain      Admission Date  3/8/19    Discharge Date  03/9/19    CODE STATUS  Full Code    HPI & HOSPITAL COURSE  This is a 54 y.o. female with PMH staghorn renal calculus (followed by Dr. Preez) and HTN here with left sided flank pain.  She had no leukocytosis and no fevers.  CT again showed large left staghorn calculus with calcaneal dilatation, stable since prior imaging.  She was started on IV antibiotics.  Urology was consulted and recommended outpatient follow-up for left nephrectomy in the future.  They also recommend 2-week course of oral antibiotics.   She was seen and examined prior to discharge.  She has ongoing 6/10 left-sided flank pain.  She will be prescribed small amount tramadol at time of discharge.  She and her family member are instructed to take the narcotics only as prescribed, do not share them with others, and dispose of any extra doses as instructed by your pharmacy.  All are in agreement to the plan of care and eager for discharge this morning.    In prescribing controlled substances to this patient, I certify that I have obtained and reviewed the medical history of Martha Reyes-Martinez. I have also made a good valentin effort to obtain applicable records from other providers who have treated the patient and records did not demonstrate any increased risk of substance abuse that would prevent me from prescribing controlled substances.     I have conducted a physical exam and documented it. I have reviewed Ms. Reyes-Martinez’s prescription history as maintained by the [place] Prescription Monitoring Program.     I have assessed the patient’s risk for abuse,  dependency, and addiction using the validated Opioid Risk Tool available at https://www.mdcalc.com/yuidkr-rkev-bnsn-ort-narcotic-abuse.     Given the above, I believe the benefits of controlled substance therapy outweigh the risks. The reasons for prescribing controlled substances include non-narcotic, oral analgesic alternatives have been inadequate for pain control. Accordingly, I have discussed the risk and benefits, treatment plan, and alternative therapies with the patient.     Therefore, she is discharged in fair and stable condition to home with close outpatient follow-up.    FOLLOW UP ITEMS POST DISCHARGE  -FU with PCP  -FU with Nephrology  -Return to the nearest emergency department or call 911 for worsening symptoms    DISCHARGE DIAGNOSES  Active Problems:    Staghorn renal calculus POA: Yes    Hypertension POA: Yes    Pyelonephritis POA: Yes    Costochondritis POA: Yes  Resolved Problems:    * No resolved hospital problems. *      FOLLOW UP  MYCHAL Collier  [address] [place] [id number]  [phone]    Schedule an appointment as soon as possible for a visit         MEDICATIONS ON DISCHARGE     Medication List      START taking these medications      Instructions   ciprofloxacin 500 MG Tabs  Commonly known as:  CIPRO   Take 1 Tab by mouth 2 times a day for 14 days.  Dose:  500 mg     tramadol 50 MG Tabs  Commonly known as:  ULTRAM   Take 1 Tab by mouth every four hours as needed for Moderate Pain or Severe Pain for up to 10 days.  Dose:  50 mg        CONTINUE taking these medications      Instructions   CALCIUM 600+D 600-200 MG-UNIT Tabs  Generic drug:  Calcium Carbonate-Vitamin D   Take 1 Tab by mouth every day.  Dose:  1 Tab     lisinopril 20 MG Tabs  Commonly known as:  PRINIVIL   Take 20 mg by mouth every day.  Dose:  20 mg     PROBIOTIC DAILY Caps   Take 1 Cap by mouth every day.  Dose:  1 Cap            Allergies  No Known Allergies    DIET  Orders Placed This Encounter    Procedures   • Diet Order Regular     Standing Status:   Standing     Number of Occurrences:   1     Order Specific Question:   Diet:     Answer:   Regular [1]       ACTIVITY  As tolerated.  Weight bearing as tolerated    CONSULTATIONS  Urology    PROCEDURES  NA    LABORATORY  Lab Results   Component Value Date    SODIUM 141 03/09/2019    POTASSIUM 4.1 03/09/2019    CHLORIDE 109 03/09/2019    CO2 24 03/09/2019    GLUCOSE 113 (H) 03/09/2019    BUN 17 03/09/2019    CREATININE 0.70 03/09/2019        Lab Results   Component Value Date    WBC 7.7 03/09/2019    HEMOGLOBIN 13.9 03/09/2019    HEMATOCRIT 41.6 03/09/2019    PLATELETCT 243 03/09/2019        BECKY Vasquez.

## 2019-03-09 NOTE — PROGRESS NOTES
Medicated for left flank pain 8/10, daughter at bedside to interpret. Instructed to be npo, awaiting urologist .

## 2019-03-09 NOTE — ED PROVIDER NOTES
ED Provider Note    CHIEF COMPLAINT  Chief Complaint   Patient presents with   • Flank Pain     Pt. states right sided flank pain that is sharp, stabbing pain in her right flank x 2 days. Pt. states she has a hx of chronic kidney stones. Pt. denies any N/V. Pt. does states frequent urination.        HPI  Martha Reyes-Martinez is a 54 y.o. female who presents with chief complaint of left-sided upper abdominal pain and flank pain for the last 2 days.  She reports the pain comes and goes and is typically worse with movement of her trunk.  She reports mild postprandial pain.  Patient denies any right upper quadrant pain as stated in the triage complaint.  Patient denies any associated nausea or vomiting.  Patient denies any fevers or constitutional symptoms.  Patient denies any associated chest pain, shortness of breath or decreased exertional capacity.  Patient denies any association of pain with exertion.  Patient denies any associated diaphoresis.  Patient reports a long-standing history of kidney stones and reports that the pain is relatively similar however she denies any associated dysuria, hematuria or urinary symptoms otherwise.  History was taken with the assistance of a fluent .    REVIEW OF SYSTEMS  Review of Systems   Constitutional: Negative for chills, fever and malaise/fatigue.   Cardiovascular: Negative for chest pain and palpitations.   Gastrointestinal: Positive for abdominal pain. Negative for blood in stool, constipation, diarrhea, nausea and vomiting.   Genitourinary: Negative for dysuria, frequency and urgency.   Musculoskeletal: Negative for myalgias.   Neurological: Negative for dizziness, tingling and headaches.       See HPI for further details. All other systems are negative.     PAST MEDICAL HISTORY   has a past medical history of Hypertension and Nephrolithiasis.    SOCIAL HISTORY  Social History     Social History Main Topics   • Smoking status: Never Smoker   • Smokeless  tobacco: Never Used   • Alcohol use No   • Drug use: No   • Sexual activity: Not on file       SURGICAL HISTORY  patient denies any surgical history    CURRENT MEDICATIONS  Home Medications     Reviewed by Marii Sanchez R.N. (Registered Nurse) on 03/08/19 at 1933  Med List Status: Partial   Medication Last Dose Status        Patient Jorge Taking any Medications                       ALLERGIES  No Known Allergies    PHYSICAL EXAM  Physical Exam   Constitutional: She is oriented to person, place, and time. She appears well-developed and well-nourished.   HENT:   Head: Normocephalic and atraumatic.   Eyes: Conjunctivae are normal.   Neck: Normal range of motion. Neck supple.   Cardiovascular: Normal rate and regular rhythm.    Pulmonary/Chest: Effort normal and breath sounds normal.   Abdominal: Soft. Bowel sounds are normal. She exhibits no distension. There is no tenderness. There is no rebound.   No tenderness on abdominal exam when patient was relaxed however when I have patient flex her abdominal muscles impress her left upper quadrant and then tenderness of provoked, otherwise no other tenderness, no CVA tenderness   Neurological: She is alert and oriented to person, place, and time.   Skin: Skin is warm and dry. No rash noted.   Psychiatric: She has a normal mood and affect. Her behavior is normal.         DIAGNOSTIC STUDIES / PROCEDURES    EKG  Normal sinus rhythm normal axis normal intervals no ST changes consistent with acute regional ischemia    LABS  Results for orders placed or performed during the hospital encounter of 03/08/19   CBC WITH DIFFERENTIAL   Result Value Ref Range    WBC 8.4 4.8 - 10.8 K/uL    RBC 4.88 4.20 - 5.40 M/uL    Hemoglobin 14.6 12.0 - 16.0 g/dL    Hematocrit 42.0 37.0 - 47.0 %    MCV 86.1 81.4 - 97.8 fL    MCH 29.9 27.0 - 33.0 pg    MCHC 34.8 33.6 - 35.0 g/dL    RDW 37.4 35.9 - 50.0 fL    Platelet Count 262 164 - 446 K/uL    MPV 10.2 9.0 - 12.9 fL    Neutrophils-Polys 49.30  44.00 - 72.00 %    Lymphocytes 38.00 22.00 - 41.00 %    Monocytes 7.30 0.00 - 13.40 %    Eosinophils 4.10 0.00 - 6.90 %    Basophils 0.70 0.00 - 1.80 %    Immature Granulocytes 0.60 0.00 - 0.90 %    Nucleated RBC 0.00 /100 WBC    Neutrophils (Absolute) 4.15 2.00 - 7.15 K/uL    Lymphs (Absolute) 3.21 1.00 - 4.80 K/uL    Monos (Absolute) 0.62 0.00 - 0.85 K/uL    Eos (Absolute) 0.35 0.00 - 0.51 K/uL    Baso (Absolute) 0.06 0.00 - 0.12 K/uL    Immature Granulocytes (abs) 0.05 0.00 - 0.11 K/uL    NRBC (Absolute) 0.00 K/uL   COMP METABOLIC PANEL   Result Value Ref Range    Sodium 138 135 - 145 mmol/L    Potassium 3.7 3.6 - 5.5 mmol/L    Chloride 106 96 - 112 mmol/L    Co2 24 20 - 33 mmol/L    Anion Gap 8.0 0.0 - 11.9    Glucose 130 (H) 65 - 99 mg/dL    Bun 15 8 - 22 mg/dL    Creatinine 0.66 0.50 - 1.40 mg/dL    Calcium 10.3 8.5 - 10.5 mg/dL    AST(SGOT) 36 12 - 45 U/L    ALT(SGPT) 59 (H) 2 - 50 U/L    Alkaline Phosphatase 97 30 - 99 U/L    Total Bilirubin 0.4 0.1 - 1.5 mg/dL    Albumin 4.3 3.2 - 4.9 g/dL    Total Protein 7.7 6.0 - 8.2 g/dL    Globulin 3.4 1.9 - 3.5 g/dL    A-G Ratio 1.3 g/dL   LIPASE   Result Value Ref Range    Lipase 41 11 - 82 U/L   URINALYSIS,CULTURE IF INDICATED   Result Value Ref Range    Color Yellow     Character Clear     Specific Gravity 1.013 <1.035    Ph 6.0 5.0 - 8.0    Glucose Negative Negative mg/dL    Ketones Negative Negative mg/dL    Protein Negative Negative mg/dL    Bilirubin Negative Negative    Urobilinogen, Urine 0.2 Negative    Nitrite Negative Negative    Leukocyte Esterase Large (A) Negative    Occult Blood Small (A) Negative    Micro Urine Req Microscopic    URINE MICROSCOPIC (W/UA)   Result Value Ref Range    WBC  (A) /hpf    RBC 2-5 (A) /hpf    Bacteria Negative None /hpf    Epithelial Cells Negative /hpf    Hyaline Cast 0-2 /lpf   ESTIMATED GFR   Result Value Ref Range    GFR If African American >60 >60 mL/min/1.73 m 2    GFR If Non African American >60 >60 mL/min/1.73 m  2   EKG   Result Value Ref Range    Report       [facility]    Test Date:  2019  Pt Name:    MARTHA REYES-MARTINEZ        Department: ER  MRN:        [id number]                      Room:        60  Gender:     Female                       Technician: [id number]  :        1965                   Requested By:DANNY STAFFORD  Order #:    [id number]                    Reading MD:    Measurements  Intervals                                Axis  Rate:       68                           P:          53  OK:         208                          QRS:        40  QRSD:       84                           T:          52  QT:         404  QTc:        430    Interpretive Statements  SINUS RHYTHM  BORDERLINE AV CONDUCTION DELAY  RSR' IN V1 OR V2, RIGHT VCD OR RVH  No previous ECG available for comparison           RADIOLOGY  CT-ABDOMEN-PELVIS W/O    (Results Pending)           COURSE & MEDICAL DECISION MAKING  Pertinent Labs & Imaging studies reviewed. (See chart for details)  Patient here with symptoms most consistent with an abdominal muscle strain especially given her exam however given her age I do believe that basic labs for further risk stratification.  Will check screening EKG although patient has no other cardiopulmonary symptoms to suggest a cardiopulmonary diagnosis I believe atypical ACS, PE or dissection is highly unlikely.  Given patient's history of kidney stone will check CT stone protocol, trend creatinine to ensure the patient is not in kidney failure.  We will continue to reassess.  Patient's basic labs are reassuring.   I have reviewed patient's imaging and it reveals a large staghorn calculus which appears consistent and unchanged from CT done in August.  Patient kidney function is normal.  Patient's urinalysis is consistent with infection, given the associated stone with associated infection I believe the patient would benefit from admission for IV antibiotics  observation and ensure that she does not worsen.  I will discussed the case with both urology and hospitalist.    FINAL IMPRESSION  1.  Infected stone, nephrolithiasis, staghorn calculus  1. Flank pain            Electronically signed by: Rafael Cole, 3/8/2019 8:27 PM

## 2019-03-10 LAB
BACTERIA UR CULT: NORMAL
SIGNIFICANT IND 70042: NORMAL
SITE SITE: NORMAL
SOURCE SOURCE: NORMAL

## 2019-03-11 LAB
BACTERIA BLD CULT: ABNORMAL
BACTERIA BLD CULT: ABNORMAL
SIGNIFICANT IND 70042: ABNORMAL
SITE SITE: ABNORMAL
SOURCE SOURCE: ABNORMAL

## 2019-03-14 LAB
BACTERIA BLD CULT: NORMAL
SIGNIFICANT IND 70042: NORMAL
SITE SITE: NORMAL
SOURCE SOURCE: NORMAL

## 2019-04-26 DIAGNOSIS — Z01.812 PRE-OPERATIVE LABORATORY EXAMINATION: ICD-10-CM

## 2019-04-26 LAB
ANION GAP SERPL CALC-SCNC: 8 MMOL/L (ref 0–11.9)
APPEARANCE UR: CLEAR
BACTERIA #/AREA URNS HPF: NEGATIVE /HPF
BILIRUB UR QL STRIP.AUTO: NEGATIVE
BUN SERPL-MCNC: 14 MG/DL (ref 8–22)
CALCIUM SERPL-MCNC: 10.2 MG/DL (ref 8.5–10.5)
CHLORIDE SERPL-SCNC: 105 MMOL/L (ref 96–112)
CO2 SERPL-SCNC: 26 MMOL/L (ref 20–33)
COLOR UR: YELLOW
CREAT SERPL-MCNC: 0.69 MG/DL (ref 0.5–1.4)
EPI CELLS #/AREA URNS HPF: NEGATIVE /HPF
ERYTHROCYTE [DISTWIDTH] IN BLOOD BY AUTOMATED COUNT: 37.9 FL (ref 35.9–50)
GLUCOSE SERPL-MCNC: 106 MG/DL (ref 65–99)
GLUCOSE UR STRIP.AUTO-MCNC: NEGATIVE MG/DL
HCT VFR BLD AUTO: 45 % (ref 37–47)
HGB BLD-MCNC: 14.8 G/DL (ref 12–16)
HYALINE CASTS #/AREA URNS LPF: ABNORMAL /LPF
KETONES UR STRIP.AUTO-MCNC: NEGATIVE MG/DL
LEUKOCYTE ESTERASE UR QL STRIP.AUTO: ABNORMAL
MCH RBC QN AUTO: 29 PG (ref 27–33)
MCHC RBC AUTO-ENTMCNC: 32.9 G/DL (ref 33.6–35)
MCV RBC AUTO: 88.2 FL (ref 81.4–97.8)
MICRO URNS: ABNORMAL
NITRITE UR QL STRIP.AUTO: NEGATIVE
PH UR STRIP.AUTO: 6.5 [PH]
PLATELET # BLD AUTO: 271 K/UL (ref 164–446)
PMV BLD AUTO: 10.7 FL (ref 9–12.9)
POTASSIUM SERPL-SCNC: 3.8 MMOL/L (ref 3.6–5.5)
PROT UR QL STRIP: NEGATIVE MG/DL
RBC # BLD AUTO: 5.1 M/UL (ref 4.2–5.4)
RBC # URNS HPF: ABNORMAL /HPF
RBC UR QL AUTO: ABNORMAL
SODIUM SERPL-SCNC: 139 MMOL/L (ref 135–145)
SP GR UR STRIP.AUTO: 1.01
UROBILINOGEN UR STRIP.AUTO-MCNC: 0.2 MG/DL
WBC # BLD AUTO: 8.2 K/UL (ref 4.8–10.8)
WBC #/AREA URNS HPF: ABNORMAL /HPF

## 2019-04-26 PROCEDURE — 80048 BASIC METABOLIC PNL TOTAL CA: CPT

## 2019-04-26 PROCEDURE — 81001 URINALYSIS AUTO W/SCOPE: CPT

## 2019-04-26 PROCEDURE — 85027 COMPLETE CBC AUTOMATED: CPT

## 2019-04-26 PROCEDURE — 87086 URINE CULTURE/COLONY COUNT: CPT

## 2019-04-26 PROCEDURE — 36415 COLL VENOUS BLD VENIPUNCTURE: CPT

## 2019-04-26 RX ORDER — HYDROCHLOROTHIAZIDE 25 MG/1
25 TABLET ORAL DAILY
COMMUNITY

## 2019-04-28 LAB
BACTERIA UR CULT: NORMAL
SIGNIFICANT IND 70042: NORMAL
SITE SITE: NORMAL
SOURCE SOURCE: NORMAL

## 2019-04-29 ENCOUNTER — ANESTHESIA (OUTPATIENT)
Dept: SURGERY | Facility: MEDICAL CENTER | Age: 54
DRG: 661 | End: 2019-04-29
Payer: MEDICAID

## 2019-04-29 ENCOUNTER — ANESTHESIA EVENT (OUTPATIENT)
Dept: SURGERY | Facility: MEDICAL CENTER | Age: 54
DRG: 661 | End: 2019-04-29
Payer: MEDICAID

## 2019-04-29 ENCOUNTER — HOSPITAL ENCOUNTER (INPATIENT)
Facility: MEDICAL CENTER | Age: 54
LOS: 2 days | DRG: 661 | End: 2019-05-01
Attending: UROLOGY | Admitting: UROLOGY
Payer: MEDICAID

## 2019-04-29 DIAGNOSIS — G89.18 POST-OPERATIVE PAIN: ICD-10-CM

## 2019-04-29 DIAGNOSIS — N12 PYELONEPHRITIS: ICD-10-CM

## 2019-04-29 LAB — PATHOLOGY CONSULT NOTE: NORMAL

## 2019-04-29 PROCEDURE — C1725 CATH, TRANSLUMIN NON-LASER: HCPCS | Performed by: UROLOGY

## 2019-04-29 PROCEDURE — 700101 HCHG RX REV CODE 250: Performed by: UROLOGY

## 2019-04-29 PROCEDURE — 700101 HCHG RX REV CODE 250: Performed by: ANESTHESIOLOGY

## 2019-04-29 PROCEDURE — 160048 HCHG OR STATISTICAL LEVEL 1-5: Performed by: UROLOGY

## 2019-04-29 PROCEDURE — 160029 HCHG SURGERY MINUTES - 1ST 30 MINS LEVEL 4: Performed by: UROLOGY

## 2019-04-29 PROCEDURE — 88307 TISSUE EXAM BY PATHOLOGIST: CPT

## 2019-04-29 PROCEDURE — 160035 HCHG PACU - 1ST 60 MINS PHASE I: Performed by: UROLOGY

## 2019-04-29 PROCEDURE — 500514 HCHG ENDOCLIP: Performed by: UROLOGY

## 2019-04-29 PROCEDURE — 700105 HCHG RX REV CODE 258: Performed by: UROLOGY

## 2019-04-29 PROCEDURE — 160041 HCHG SURGERY MINUTES - EA ADDL 1 MIN LEVEL 4: Performed by: UROLOGY

## 2019-04-29 PROCEDURE — 501576 HCHG TROCAR, SMTH CAN&SEAL12: Performed by: UROLOGY

## 2019-04-29 PROCEDURE — 500594 HCHG GELPORT: Performed by: UROLOGY

## 2019-04-29 PROCEDURE — 501838 HCHG SUTURE GENERAL: Performed by: UROLOGY

## 2019-04-29 PROCEDURE — 502571 HCHG PACK, LAP CHOLE: Performed by: UROLOGY

## 2019-04-29 PROCEDURE — 160036 HCHG PACU - EA ADDL 30 MINS PHASE I: Performed by: UROLOGY

## 2019-04-29 PROCEDURE — 0WJG4ZZ INSPECTION OF PERITONEAL CAVITY, PERCUTANEOUS ENDOSCOPIC APPROACH: ICD-10-PCS | Performed by: UROLOGY

## 2019-04-29 PROCEDURE — 501570 HCHG TROCAR, SEPARATOR: Performed by: UROLOGY

## 2019-04-29 PROCEDURE — 700111 HCHG RX REV CODE 636 W/ 250 OVERRIDE (IP): Performed by: UROLOGY

## 2019-04-29 PROCEDURE — 501450 HCHG STAPLES, ENDO MULTIFIRE: Performed by: UROLOGY

## 2019-04-29 PROCEDURE — 500868 HCHG NEEDLE, SURGI(VARES): Performed by: UROLOGY

## 2019-04-29 PROCEDURE — 160002 HCHG RECOVERY MINUTES (STAT): Performed by: UROLOGY

## 2019-04-29 PROCEDURE — 501583 HCHG TROCAR, THRD CAN&SEAL 5X100: Performed by: UROLOGY

## 2019-04-29 PROCEDURE — A4314 CATH W/DRAINAGE 2-WAY LATEX: HCPCS | Performed by: UROLOGY

## 2019-04-29 PROCEDURE — 700102 HCHG RX REV CODE 250 W/ 637 OVERRIDE(OP): Performed by: UROLOGY

## 2019-04-29 PROCEDURE — 160009 HCHG ANES TIME/MIN: Performed by: UROLOGY

## 2019-04-29 PROCEDURE — A9270 NON-COVERED ITEM OR SERVICE: HCPCS | Performed by: ANESTHESIOLOGY

## 2019-04-29 PROCEDURE — 501338 HCHG SHEARS, ENDO: Performed by: UROLOGY

## 2019-04-29 PROCEDURE — 700111 HCHG RX REV CODE 636 W/ 250 OVERRIDE (IP): Performed by: ANESTHESIOLOGY

## 2019-04-29 PROCEDURE — 770006 HCHG ROOM/CARE - MED/SURG/GYN SEMI*

## 2019-04-29 PROCEDURE — 501445 HCHG STAPLER, SKIN DISP: Performed by: UROLOGY

## 2019-04-29 PROCEDURE — 0TT10ZZ RESECTION OF LEFT KIDNEY, OPEN APPROACH: ICD-10-PCS | Performed by: UROLOGY

## 2019-04-29 PROCEDURE — A9270 NON-COVERED ITEM OR SERVICE: HCPCS | Performed by: UROLOGY

## 2019-04-29 PROCEDURE — 700102 HCHG RX REV CODE 250 W/ 637 OVERRIDE(OP): Performed by: ANESTHESIOLOGY

## 2019-04-29 RX ORDER — MEPERIDINE HYDROCHLORIDE 25 MG/ML
6.25 INJECTION INTRAMUSCULAR; INTRAVENOUS; SUBCUTANEOUS
Status: DISCONTINUED | OUTPATIENT
Start: 2019-04-29 | End: 2019-04-29 | Stop reason: HOSPADM

## 2019-04-29 RX ORDER — HYDROMORPHONE HYDROCHLORIDE 1 MG/ML
0.2 INJECTION, SOLUTION INTRAMUSCULAR; INTRAVENOUS; SUBCUTANEOUS
Status: DISCONTINUED | OUTPATIENT
Start: 2019-04-29 | End: 2019-04-29 | Stop reason: HOSPADM

## 2019-04-29 RX ORDER — HYDROMORPHONE HYDROCHLORIDE 1 MG/ML
0.4 INJECTION, SOLUTION INTRAMUSCULAR; INTRAVENOUS; SUBCUTANEOUS
Status: DISCONTINUED | OUTPATIENT
Start: 2019-04-29 | End: 2019-04-29 | Stop reason: HOSPADM

## 2019-04-29 RX ORDER — PHENYLEPHRINE HYDROCHLORIDE 10 MG/ML
INJECTION, SOLUTION INTRAMUSCULAR; INTRAVENOUS; SUBCUTANEOUS PRN
Status: DISCONTINUED | OUTPATIENT
Start: 2019-04-29 | End: 2019-04-29 | Stop reason: SURG

## 2019-04-29 RX ORDER — SODIUM CHLORIDE, SODIUM LACTATE, POTASSIUM CHLORIDE, CALCIUM CHLORIDE 600; 310; 30; 20 MG/100ML; MG/100ML; MG/100ML; MG/100ML
INJECTION, SOLUTION INTRAVENOUS CONTINUOUS
Status: ACTIVE | OUTPATIENT
Start: 2019-04-29 | End: 2019-04-29

## 2019-04-29 RX ORDER — HYDRALAZINE HYDROCHLORIDE 20 MG/ML
5 INJECTION INTRAMUSCULAR; INTRAVENOUS
Status: DISCONTINUED | OUTPATIENT
Start: 2019-04-29 | End: 2019-04-29 | Stop reason: HOSPADM

## 2019-04-29 RX ORDER — ONDANSETRON 2 MG/ML
4 INJECTION INTRAMUSCULAR; INTRAVENOUS EVERY 4 HOURS PRN
Status: DISCONTINUED | OUTPATIENT
Start: 2019-04-29 | End: 2019-05-01 | Stop reason: HOSPADM

## 2019-04-29 RX ORDER — OXYCODONE HCL 5 MG/5 ML
5 SOLUTION, ORAL ORAL
Status: COMPLETED | OUTPATIENT
Start: 2019-04-29 | End: 2019-04-29

## 2019-04-29 RX ORDER — HYDROMORPHONE HYDROCHLORIDE 1 MG/ML
0.1 INJECTION, SOLUTION INTRAMUSCULAR; INTRAVENOUS; SUBCUTANEOUS
Status: DISCONTINUED | OUTPATIENT
Start: 2019-04-29 | End: 2019-04-29 | Stop reason: HOSPADM

## 2019-04-29 RX ORDER — OXYCODONE HYDROCHLORIDE AND ACETAMINOPHEN 5; 325 MG/1; MG/1
1-2 TABLET ORAL EVERY 4 HOURS PRN
Status: DISCONTINUED | OUTPATIENT
Start: 2019-04-29 | End: 2019-05-01 | Stop reason: HOSPADM

## 2019-04-29 RX ORDER — KETAMINE HYDROCHLORIDE 50 MG/ML
INJECTION, SOLUTION INTRAMUSCULAR; INTRAVENOUS PRN
Status: DISCONTINUED | OUTPATIENT
Start: 2019-04-29 | End: 2019-04-29 | Stop reason: SURG

## 2019-04-29 RX ORDER — SODIUM CHLORIDE, SODIUM LACTATE, POTASSIUM CHLORIDE, CALCIUM CHLORIDE 600; 310; 30; 20 MG/100ML; MG/100ML; MG/100ML; MG/100ML
INJECTION, SOLUTION INTRAVENOUS CONTINUOUS
Status: DISCONTINUED | OUTPATIENT
Start: 2019-04-29 | End: 2019-04-29 | Stop reason: HOSPADM

## 2019-04-29 RX ORDER — DEXTROSE MONOHYDRATE, SODIUM CHLORIDE, AND POTASSIUM CHLORIDE 50; 1.49; 4.5 G/1000ML; G/1000ML; G/1000ML
INJECTION, SOLUTION INTRAVENOUS CONTINUOUS
Status: DISCONTINUED | OUTPATIENT
Start: 2019-04-29 | End: 2019-05-01 | Stop reason: HOSPADM

## 2019-04-29 RX ORDER — OXYCODONE HCL 5 MG/5 ML
10 SOLUTION, ORAL ORAL
Status: COMPLETED | OUTPATIENT
Start: 2019-04-29 | End: 2019-04-29

## 2019-04-29 RX ORDER — HYDROMORPHONE HYDROCHLORIDE 2 MG/ML
INJECTION, SOLUTION INTRAMUSCULAR; INTRAVENOUS; SUBCUTANEOUS PRN
Status: DISCONTINUED | OUTPATIENT
Start: 2019-04-29 | End: 2019-04-29 | Stop reason: SURG

## 2019-04-29 RX ORDER — HYDROCHLOROTHIAZIDE 25 MG/1
25 TABLET ORAL DAILY
Status: DISCONTINUED | OUTPATIENT
Start: 2019-04-30 | End: 2019-05-01 | Stop reason: HOSPADM

## 2019-04-29 RX ORDER — CEFAZOLIN SODIUM 1 G/3ML
INJECTION, POWDER, FOR SOLUTION INTRAMUSCULAR; INTRAVENOUS PRN
Status: DISCONTINUED | OUTPATIENT
Start: 2019-04-29 | End: 2019-04-29 | Stop reason: SURG

## 2019-04-29 RX ORDER — MAGNESIUM HYDROXIDE 1200 MG/15ML
LIQUID ORAL
Status: DISCONTINUED | OUTPATIENT
Start: 2019-04-29 | End: 2019-04-29 | Stop reason: HOSPADM

## 2019-04-29 RX ORDER — HYDROMORPHONE HYDROCHLORIDE 1 MG/ML
0.5 INJECTION, SOLUTION INTRAMUSCULAR; INTRAVENOUS; SUBCUTANEOUS
Status: DISCONTINUED | OUTPATIENT
Start: 2019-04-29 | End: 2019-05-01 | Stop reason: HOSPADM

## 2019-04-29 RX ORDER — HALOPERIDOL 5 MG/ML
1 INJECTION INTRAMUSCULAR
Status: DISCONTINUED | OUTPATIENT
Start: 2019-04-29 | End: 2019-04-29 | Stop reason: HOSPADM

## 2019-04-29 RX ORDER — POLYETHYLENE GLYCOL 3350 17 G/17G
1 POWDER, FOR SOLUTION ORAL DAILY
Status: DISCONTINUED | OUTPATIENT
Start: 2019-04-30 | End: 2019-05-01 | Stop reason: HOSPADM

## 2019-04-29 RX ORDER — DIPHENHYDRAMINE HYDROCHLORIDE 50 MG/ML
12.5 INJECTION INTRAMUSCULAR; INTRAVENOUS
Status: DISCONTINUED | OUTPATIENT
Start: 2019-04-29 | End: 2019-04-29 | Stop reason: HOSPADM

## 2019-04-29 RX ORDER — BUPIVACAINE HYDROCHLORIDE AND EPINEPHRINE 5; 5 MG/ML; UG/ML
INJECTION, SOLUTION EPIDURAL; INTRACAUDAL; PERINEURAL
Status: DISCONTINUED | OUTPATIENT
Start: 2019-04-29 | End: 2019-04-29 | Stop reason: HOSPADM

## 2019-04-29 RX ORDER — ONDANSETRON 2 MG/ML
4 INJECTION INTRAMUSCULAR; INTRAVENOUS
Status: DISCONTINUED | OUTPATIENT
Start: 2019-04-29 | End: 2019-04-29 | Stop reason: HOSPADM

## 2019-04-29 RX ORDER — LISINOPRIL 20 MG/1
20 TABLET ORAL DAILY
Status: DISCONTINUED | OUTPATIENT
Start: 2019-04-30 | End: 2019-05-01 | Stop reason: HOSPADM

## 2019-04-29 RX ORDER — DEXTROSE MONOHYDRATE, SODIUM CHLORIDE, AND POTASSIUM CHLORIDE 50; 1.49; 4.5 G/1000ML; G/1000ML; G/1000ML
INJECTION, SOLUTION INTRAVENOUS EVERY 6 HOURS
Status: COMPLETED | OUTPATIENT
Start: 2019-04-29 | End: 2019-04-30

## 2019-04-29 RX ADMIN — PROPOFOL 150 MG: 10 INJECTION, EMULSION INTRAVENOUS at 12:08

## 2019-04-29 RX ADMIN — ONDANSETRON 4 MG: 2 INJECTION INTRAMUSCULAR; INTRAVENOUS at 23:36

## 2019-04-29 RX ADMIN — PHENYLEPHRINE HYDROCHLORIDE 100 MCG: 10 INJECTION INTRAVENOUS at 15:05

## 2019-04-29 RX ADMIN — OXYCODONE HYDROCHLORIDE 10 MG: 5 SOLUTION ORAL at 16:24

## 2019-04-29 RX ADMIN — FENTANYL CITRATE 50 MCG: 50 INJECTION, SOLUTION INTRAMUSCULAR; INTRAVENOUS at 15:20

## 2019-04-29 RX ADMIN — HYDROMORPHONE HYDROCHLORIDE 0.2 MG: 1 INJECTION, SOLUTION INTRAMUSCULAR; INTRAVENOUS; SUBCUTANEOUS at 17:36

## 2019-04-29 RX ADMIN — FENTANYL CITRATE 50 MCG: 50 INJECTION, SOLUTION INTRAMUSCULAR; INTRAVENOUS at 15:15

## 2019-04-29 RX ADMIN — HYDROMORPHONE HYDROCHLORIDE 0.5 MG: 2 INJECTION, SOLUTION INTRAMUSCULAR; INTRAVENOUS; SUBCUTANEOUS at 13:15

## 2019-04-29 RX ADMIN — SODIUM CHLORIDE, POTASSIUM CHLORIDE, SODIUM LACTATE AND CALCIUM CHLORIDE: 600; 310; 30; 20 INJECTION, SOLUTION INTRAVENOUS at 11:27

## 2019-04-29 RX ADMIN — OXYCODONE HYDROCHLORIDE AND ACETAMINOPHEN 2 TABLET: 5; 325 TABLET ORAL at 19:44

## 2019-04-29 RX ADMIN — ROCURONIUM BROMIDE 30 MG: 10 INJECTION, SOLUTION INTRAVENOUS at 14:30

## 2019-04-29 RX ADMIN — CEFAZOLIN 2 G: 330 INJECTION, POWDER, FOR SOLUTION INTRAMUSCULAR; INTRAVENOUS at 12:25

## 2019-04-29 RX ADMIN — ROCURONIUM BROMIDE 30 MG: 10 INJECTION, SOLUTION INTRAVENOUS at 12:58

## 2019-04-29 RX ADMIN — HYDROMORPHONE HYDROCHLORIDE 0.2 MG: 1 INJECTION, SOLUTION INTRAMUSCULAR; INTRAVENOUS; SUBCUTANEOUS at 16:40

## 2019-04-29 RX ADMIN — MIDAZOLAM HYDROCHLORIDE 2 MG: 1 INJECTION, SOLUTION INTRAMUSCULAR; INTRAVENOUS at 12:03

## 2019-04-29 RX ADMIN — POTASSIUM CHLORIDE, DEXTROSE MONOHYDRATE AND SODIUM CHLORIDE: 150; 5; 450 INJECTION, SOLUTION INTRAVENOUS at 19:35

## 2019-04-29 RX ADMIN — HYDROMORPHONE HYDROCHLORIDE 0.5 MG: 1 INJECTION, SOLUTION INTRAMUSCULAR; INTRAVENOUS; SUBCUTANEOUS at 22:51

## 2019-04-29 RX ADMIN — KETAMINE HYDROCHLORIDE 25 MG: 50 INJECTION, SOLUTION, CONCENTRATE INTRAMUSCULAR; INTRAVENOUS at 12:35

## 2019-04-29 RX ADMIN — FENTANYL CITRATE 25 MCG: 50 INJECTION, SOLUTION INTRAMUSCULAR; INTRAVENOUS at 16:25

## 2019-04-29 RX ADMIN — LIDOCAINE HYDROCHLORIDE 50 MG: 20 INJECTION, SOLUTION INFILTRATION; PERINEURAL at 12:08

## 2019-04-29 RX ADMIN — HYDROMORPHONE HYDROCHLORIDE 0.2 MG: 1 INJECTION, SOLUTION INTRAMUSCULAR; INTRAVENOUS; SUBCUTANEOUS at 16:28

## 2019-04-29 RX ADMIN — ROCURONIUM BROMIDE 50 MG: 10 INJECTION, SOLUTION INTRAVENOUS at 12:08

## 2019-04-29 RX ADMIN — PHENYLEPHRINE HYDROCHLORIDE 100 MCG: 10 INJECTION INTRAVENOUS at 12:29

## 2019-04-29 RX ADMIN — ROCURONIUM BROMIDE 20 MG: 10 INJECTION, SOLUTION INTRAVENOUS at 14:00

## 2019-04-29 RX ADMIN — SODIUM CHLORIDE, POTASSIUM CHLORIDE, SODIUM LACTATE AND CALCIUM CHLORIDE: 600; 310; 30; 20 INJECTION, SOLUTION INTRAVENOUS at 13:15

## 2019-04-29 RX ADMIN — ONDANSETRON 4 MG: 2 INJECTION INTRAMUSCULAR; INTRAVENOUS at 19:31

## 2019-04-29 RX ADMIN — SODIUM CHLORIDE, POTASSIUM CHLORIDE, SODIUM LACTATE AND CALCIUM CHLORIDE: 600; 310; 30; 20 INJECTION, SOLUTION INTRAVENOUS at 14:05

## 2019-04-29 RX ADMIN — HYDROMORPHONE HYDROCHLORIDE 0.5 MG: 2 INJECTION, SOLUTION INTRAMUSCULAR; INTRAVENOUS; SUBCUTANEOUS at 12:35

## 2019-04-29 RX ADMIN — HYDROMORPHONE HYDROCHLORIDE 0.5 MG: 1 INJECTION, SOLUTION INTRAMUSCULAR; INTRAVENOUS; SUBCUTANEOUS at 20:48

## 2019-04-29 RX ADMIN — HYDROMORPHONE HYDROCHLORIDE 0.4 MG: 1 INJECTION, SOLUTION INTRAMUSCULAR; INTRAVENOUS; SUBCUTANEOUS at 18:03

## 2019-04-29 RX ADMIN — FENTANYL CITRATE 25 MCG: 50 INJECTION, SOLUTION INTRAMUSCULAR; INTRAVENOUS at 16:30

## 2019-04-29 ASSESSMENT — COGNITIVE AND FUNCTIONAL STATUS - GENERAL
MOBILITY SCORE: 18
PERSONAL GROOMING: A LITTLE
TOILETING: A LITTLE
STANDING UP FROM CHAIR USING ARMS: A LITTLE
TURNING FROM BACK TO SIDE WHILE IN FLAT BAD: A LITTLE
DRESSING REGULAR LOWER BODY CLOTHING: A LITTLE
SUGGESTED CMS G CODE MODIFIER MOBILITY: CK
DAILY ACTIVITIY SCORE: 19
MOVING TO AND FROM BED TO CHAIR: A LITTLE
DRESSING REGULAR UPPER BODY CLOTHING: A LITTLE
CLIMB 3 TO 5 STEPS WITH RAILING: A LITTLE
HELP NEEDED FOR BATHING: A LITTLE
MOVING FROM LYING ON BACK TO SITTING ON SIDE OF FLAT BED: A LITTLE
SUGGESTED CMS G CODE MODIFIER DAILY ACTIVITY: CK
WALKING IN HOSPITAL ROOM: A LITTLE

## 2019-04-29 ASSESSMENT — LIFESTYLE VARIABLES: EVER_SMOKED: NEVER

## 2019-04-29 NOTE — OR SURGEON
Immediate Post OP Note    PreOp Diagnosis: non-functioning L kidney with L kidney stone    PostOp Diagnosis: same    Procedure(s):  LEFT NEPHRECTOMY, LAPAROSCOPIC- SIMPLE - Wound Class: Clean with Drain    Surgeon(s):  Javier Perez M.D.    Assistant:  Figueroa Espino M.D.    Anesthesiologist/Type of Anesthesia:  Anesthesiologist: Sal Colin M.D./General    Surgical Staff:  Circulator: Keily Dawson, Student; Melody Montoya R.N.  Relief Circulator: Eugenio Alejandre R.N.  Relief Scrub: Tamela Lin  Scrub Person: Emy Escalona    Specimens removed if any:  ID Type Source Tests Collected by Time Destination   A : LEFT KIDNEY Tissue Kidney PATHOLOGY SPECIMEN Javier Perez M.D. 4/29/2019  2:55 PM        Estimated Blood Loss: 150ml    Findings: perihilar fibrosis    Complications: none    Drains:  16Fr blevins    4/29/2019 3:26 PM Javier Perez M.D.

## 2019-04-29 NOTE — OP REPORT
DATE OF SERVICE:  04/29/2019    PREOPERATIVE DIAGNOSIS:  Nonfunctioning obstructed left kidney with kidney   stone.    POSTOPERATIVE DIAGNOSIS:  Nonfunctioning obstructed left kidney with kidney   stone.    PROCEDURE PERFORMED:  Hand-assisted laparoscopic simple left nephrectomy.    SURGEON:  Javier Perez MD    ASSISTANT:  Figueroa Espino MD    ANESTHESIOLOGIST:  Sal Colin MD    ANESTHESIA:  General.    INDICATIONS FOR PROCEDURE:  The patient is a 54-year-old female with a history   of stones, recent imaging showing atrophic left kidney with large obstructing   stones.  Renal scan showed a little function of that kidney.  After   discussing treatment options, the patient elected for a hand-assisted   laparoscopic simple nephrectomy on the left side.    DESCRIPTION OF PROCEDURE:  After obtaining informed consent, the patient was   brought to the operating room.  After the induction of general anesthesia, a   Marlow catheter was placed and she was then positioned in lateral position with   left side up.  A time-out was called and correct side and procedure and   patient were all identified.  All in the room were in agreement.  An   approximately 8 cm incision was made in the left lower quadrant after which   two 12 mm incisions were made in the left lower quadrant as well.  Dissection   was continued down through the hand port down through the fascia and into the   peritoneum after which a hand port was placed and the abdomen was insufflated.    The camera was placed through the hand port and two 12 mm trocars were   placed in the left lower quadrant under direct vision.  A J hook was then used   to take down the white line of Toldt on the left side from the splenic   flexure all the way down to the pelvic brim.  The colon was then swept   medially and the plane between the colon mesentery and the Gerota's fascia was   developed.  The attachments laterally and superiorly were taken down and then   followed  down to the left lower quadrant where the ureter and gonadal vein   were identified.  They were clipped and cut between clips.  They were then   followed up until the hilum could be identified.  There was a lot of perihilar   fibrosis, it took quite a bit of dissection to free up and clear away from   the gonadal vessels until finally the renal vein could be clearly identified.    The renal artery had so much fibrosis around it that it was not clearly   visible, so we decided to first take the renal vein with a vascular staple   load and then take the artery with the surrounding perifibrosis.  The kidney   was then freed and was removed and held through the hand port and sent off as   specimen.  The renal hilum and renal fossae were then reinspected for   hemostasis and there was good hemostasis at this point.  There were no signs   of any other injuries.  The colon was allowed to drape back over the   retroperitoneum on the left side and the two 12 mm trocars were removed under   direct vision.  Finally, the hand port was removed.  The two 12 mm trocar site   fascia was closed using 2-0 Vicryl after which the fascia on the hand port   was closed using a #1 PDS in running fashion through both layers of the   fascia, after which the skin was closed using staples after irrigating into   the wound.  Dry sterile dressings were placed.  The patient was then awoken   from anesthesia and taken to the recovery room in stable condition.    COMPLICATIONS:  None.    ESTIMATED BLOOD LOSS:  150 mL    SPECIMENS:  Left kidney.    DRAINS:  A 16-Latvian Marlow.       ____________________________________     MD DARIN Castelan / SOURAV    DD:  04/29/2019 15:40:20  DT:  04/29/2019 15:54:02    D#:  [id number]  Job#:  [id number]

## 2019-04-29 NOTE — ANESTHESIA QCDR
2019 [facility] Clinical Data Registry (for Quality Improvement)     Postoperative nausea/vomiting risk protocol (Adult = 18 yrs and Pediatric 3-17 yrs)- (430 and 463)  General inhalation anesthetic (NOT TIVA) with PONV risk factors: Yes  Provision of anti-emetic therapy with at least 2 different classes of agents: Yes   Patient DID NOT receive anti-emetic therapy and reason is documented in Medical Record:  N/A    Multimodal Pain Management- (AQI59)  Patient undergoing Elective Surgery (i.e. Outpatient, or ASC, or Prescheduled Surgery prior to Hospital Admission): Yes  Use of Multimodal Pain Management, two or more drugs and/or interventions, NOT including systemic opioids: Yes   Exception: Documented allergy to multiple classes of analgesics:  N/A    PACU assessment of acute postoperative pain prior to Anesthesia Care End- Applies to Patients Age = 18- (ABG7)  Initial PACU pain score is which of the following: < 7/10  Patient unable to report pain score: N/A    Post-anesthetic transfer of care checklist/protocol to PACU/ICU- (426 and 427)  Upon conclusion of case, patient transferred to which of the following locations: PACU/Non-ICU  Use of transfer checklist/protocol: Yes  Exclusion: Service Performed in Patient Hospital Room (and thus did not require transfer): N/A    PACU Reintubation- (AQI31)  General anesthesia requiring endotracheal intubation (ETT) along with subsequent extubation in OR or PACU: Yes  Required reintubation in the PACU: No   Extubation was a planned trial documented in the medical record prior to removal of the original airway device:  N/A    Unplanned admission to ICU related to anesthesia service up through end of PACU care- (MD51)  Unplanned admission to ICU (not initially anticipated at anesthesia start time): No

## 2019-04-29 NOTE — ANESTHESIA TIME REPORT
Anesthesia Start and Stop Event Times     Date Time Event    4/29/2019 1203 Anesthesia Start     1525 Anesthesia Stop        Responsible Staff  04/29/19    Name Role Begin End    Sal Colin M.D. Anesth 1203 1525        Preop Diagnosis (Free Text):  Pre-op Diagnosis     CALCULUS OF KIDNEY, LEFT         Preop Diagnosis (Codes):  Diagnosis Information     Diagnosis Code(s):         Post op Diagnosis  Renal calculus, left      Premium Reason  A. 3PM - 7AM    Comments:

## 2019-04-29 NOTE — ANESTHESIA PREPROCEDURE EVALUATION
Relevant Problems   (+) Anemia   (+) Hypertension   (+) Pyelonephritis   (+) Retroperitoneal hematoma      Within Normal Limits   ANESTHESIA   ENDO      NEURO   PULMONARY       Physical Exam    Airway   Mallampati: II  TM distance: >3 FB  Neck ROM: full       Cardiovascular - normal exam  Rhythm: regular  Rate: normal  (-) murmur     Dental - normal exam         Pulmonary - normal exam  Breath sounds clear to auscultation     Abdominal    Neurological - normal exam                 Anesthesia Plan    ASA 2       Plan - general             Induction: intravenous      Pertinent diagnostic labs and testing reviewed    Informed Consent:    Anesthetic plan and risks discussed with patient.    Use of blood products discussed with: patient whom consented to blood products.

## 2019-04-29 NOTE — PROGRESS NOTES
MD ROSEANNE Cole C Uro Ma/Nurse Msg Pool             OK to call results to patient, cytology negative for high grade cancer, had urothelial clusters which is non-specific, will repeat in 6 months      Pt notified of results. Will place pt on 6 month recall list for repeat cystology.    Med rec complete per pt's family to interpret at bedside    Allergies reviewed and updated.

## 2019-04-29 NOTE — ANESTHESIA PROCEDURE NOTES
Peripheral IV  Date/Time: 4/29/2019 12:13 PM  Performed by: SONJA GONZALEZ  Authorized by: SONJA GONZALEZ     Size:  18 G  Laterality:  Left  Local Anesthetic:  None  Site Prep:  Alcohol  Technique:  Direct puncture  Attempts:  2

## 2019-04-29 NOTE — ANESTHESIA PROCEDURE NOTES
Arterial Line  Performed by: SONJA GONZALEZ  Authorized by: SONJA GONZALEZ     Start Time:  4/29/2019 12:10 PM  End Time:  4/29/2019 12:12 PM  Localization: surface landmarks    Patient Location:  OR  Indication: continuous blood pressure monitoring and blood sampling needed    Catheter Size:  20 G  Seldinger Technique?: Yes    Laterality:  Right  Site:  Radial artery  Line Secured:  Antimicrobial disc, tape and transparent dressing  Events: patient tolerated procedure well with no complications

## 2019-04-29 NOTE — ANESTHESIA PROCEDURE NOTES
Airway  Date/Time: 4/29/2019 12:08 PM  Performed by: SONJA GONZALEZ  Authorized by: SONJA GONZALEZ     Location:  OR  Urgency:  Elective  Indications for Airway Management:  Anesthesia  Spontaneous Ventilation: absent    Sedation Level:  Deep  Preoxygenated: Yes    Patient Position:  Sniffing  Mask Difficulty Assessment:  0 - not attempted  Final Airway Type:  Endotracheal airway  Final Endotracheal Airway:  ETT  Cuffed: Yes    Technique Used for Successful ETT Placement:  Direct laryngoscopy  Insertion Site:  Oral  Blade Type:  Kimberly  Laryngoscope Blade/Videolaryngoscope Blade Size:  3  ETT Size (mm):  7.0  Measured from:  Teeth  ETT to Teeth (cm):  22  Placement Verified by: auscultation and capnometry    Cormack-Lehane Classification:  Grade I - full view of glottis  Number of Attempts at Approach:  1   Very baljit tonsils

## 2019-04-30 LAB
ANION GAP SERPL CALC-SCNC: 11 MMOL/L (ref 0–11.9)
BUN SERPL-MCNC: 12 MG/DL (ref 8–22)
CALCIUM SERPL-MCNC: 9.3 MG/DL (ref 8.5–10.5)
CHLORIDE SERPL-SCNC: 103 MMOL/L (ref 96–112)
CO2 SERPL-SCNC: 25 MMOL/L (ref 20–33)
CREAT SERPL-MCNC: 0.81 MG/DL (ref 0.5–1.4)
ERYTHROCYTE [DISTWIDTH] IN BLOOD BY AUTOMATED COUNT: 36.9 FL (ref 35.9–50)
GLUCOSE SERPL-MCNC: 229 MG/DL (ref 65–99)
HCT VFR BLD AUTO: 39.9 % (ref 37–47)
HGB BLD-MCNC: 13.7 G/DL (ref 12–16)
MCH RBC QN AUTO: 29.5 PG (ref 27–33)
MCHC RBC AUTO-ENTMCNC: 34.3 G/DL (ref 33.6–35)
MCV RBC AUTO: 86 FL (ref 81.4–97.8)
PLATELET # BLD AUTO: 237 K/UL (ref 164–446)
PMV BLD AUTO: 10.4 FL (ref 9–12.9)
POTASSIUM SERPL-SCNC: 3.8 MMOL/L (ref 3.6–5.5)
RBC # BLD AUTO: 4.64 M/UL (ref 4.2–5.4)
SODIUM SERPL-SCNC: 139 MMOL/L (ref 135–145)
WBC # BLD AUTO: 10.6 K/UL (ref 4.8–10.8)

## 2019-04-30 PROCEDURE — 700101 HCHG RX REV CODE 250: Performed by: UROLOGY

## 2019-04-30 PROCEDURE — 85027 COMPLETE CBC AUTOMATED: CPT

## 2019-04-30 PROCEDURE — 770006 HCHG ROOM/CARE - MED/SURG/GYN SEMI*

## 2019-04-30 PROCEDURE — 97161 PT EVAL LOW COMPLEX 20 MIN: CPT

## 2019-04-30 PROCEDURE — 700111 HCHG RX REV CODE 636 W/ 250 OVERRIDE (IP): Performed by: UROLOGY

## 2019-04-30 PROCEDURE — 80048 BASIC METABOLIC PNL TOTAL CA: CPT

## 2019-04-30 PROCEDURE — 700102 HCHG RX REV CODE 250 W/ 637 OVERRIDE(OP): Performed by: UROLOGY

## 2019-04-30 PROCEDURE — 36415 COLL VENOUS BLD VENIPUNCTURE: CPT

## 2019-04-30 PROCEDURE — A9270 NON-COVERED ITEM OR SERVICE: HCPCS | Performed by: UROLOGY

## 2019-04-30 RX ORDER — METOCLOPRAMIDE HYDROCHLORIDE 5 MG/ML
10 INJECTION INTRAMUSCULAR; INTRAVENOUS EVERY 6 HOURS PRN
Status: DISCONTINUED | OUTPATIENT
Start: 2019-04-30 | End: 2019-05-01 | Stop reason: HOSPADM

## 2019-04-30 RX ADMIN — LISINOPRIL 20 MG: 20 TABLET ORAL at 06:23

## 2019-04-30 RX ADMIN — POTASSIUM CHLORIDE, DEXTROSE MONOHYDRATE AND SODIUM CHLORIDE: 150; 5; 450 INJECTION, SOLUTION INTRAVENOUS at 03:29

## 2019-04-30 RX ADMIN — METOCLOPRAMIDE 10 MG: 5 INJECTION, SOLUTION INTRAMUSCULAR; INTRAVENOUS at 04:07

## 2019-04-30 RX ADMIN — OXYCODONE HYDROCHLORIDE AND ACETAMINOPHEN 2 TABLET: 5; 325 TABLET ORAL at 21:21

## 2019-04-30 RX ADMIN — HYDROCHLOROTHIAZIDE 25 MG: 25 TABLET ORAL at 06:23

## 2019-04-30 RX ADMIN — HYDROMORPHONE HYDROCHLORIDE 0.5 MG: 1 INJECTION, SOLUTION INTRAMUSCULAR; INTRAVENOUS; SUBCUTANEOUS at 03:34

## 2019-04-30 RX ADMIN — POTASSIUM CHLORIDE, DEXTROSE MONOHYDRATE AND SODIUM CHLORIDE: 150; 5; 450 INJECTION, SOLUTION INTRAVENOUS at 06:26

## 2019-04-30 RX ADMIN — ONDANSETRON 4 MG: 2 INJECTION INTRAMUSCULAR; INTRAVENOUS at 03:36

## 2019-04-30 RX ADMIN — METOCLOPRAMIDE 10 MG: 5 INJECTION, SOLUTION INTRAMUSCULAR; INTRAVENOUS at 10:09

## 2019-04-30 RX ADMIN — HYDROMORPHONE HYDROCHLORIDE 0.5 MG: 1 INJECTION, SOLUTION INTRAMUSCULAR; INTRAVENOUS; SUBCUTANEOUS at 10:09

## 2019-04-30 ASSESSMENT — COGNITIVE AND FUNCTIONAL STATUS - GENERAL
STANDING UP FROM CHAIR USING ARMS: A LITTLE
MOBILITY SCORE: 12
MOVING TO AND FROM BED TO CHAIR: UNABLE
TURNING FROM BACK TO SIDE WHILE IN FLAT BAD: UNABLE
CLIMB 3 TO 5 STEPS WITH RAILING: A LITTLE
SUGGESTED CMS G CODE MODIFIER MOBILITY: CL
WALKING IN HOSPITAL ROOM: A LITTLE
MOVING FROM LYING ON BACK TO SITTING ON SIDE OF FLAT BED: UNABLE

## 2019-04-30 ASSESSMENT — PAIN SCALES - GENERAL: PAIN_LEVEL: 3

## 2019-04-30 ASSESSMENT — GAIT ASSESSMENTS
ASSISTIVE DEVICE: FRONT WHEEL WALKER
GAIT LEVEL OF ASSIST: CONTACT GUARD ASSIST
DEVIATION: ANTALGIC;BRADYKINETIC
DISTANCE (FEET): 75

## 2019-04-30 NOTE — OR NURSING
Report given to JONATHON Woodward. Pt's daughter updated on plan of care.     Pt via gurney, accompanied by transport, was transferred to [facility] at 1839. Family took Pt's belongings home.

## 2019-04-30 NOTE — CARE PLAN
Problem: Safety  Goal: Will remain free from injury    Intervention: Provide assistance with mobility  Pt calls appropriately for assistance.      Problem: Pain Management  Goal: Pain level will decrease to patient's comfort goal  Outcome: PROGRESSING SLOWER THAN EXPECTED  Pt continues to have elevated pain. Medicated per MAR, some relief.

## 2019-04-30 NOTE — THERAPY
"Physical Therapy Evaluation completed.   Bed Mobility:  Supine to Sit: Minimal Assist  Transfers: Sit to Stand: Contact Guard Assist  Gait: Level Of Assist: Contact Guard Assist with Front-Wheel Walker       Plan of Care: Will benefit from Physical Therapy 3 times per week  Discharge Recommendations: Equipment: Front-Wheel Walker. Post-acute therapy Discharge to home with outpatient or home health for additional skilled therapy services.    See \"Rehab Therapy-Acute\" Patient Summary Report for complete documentation.       Pt is a 55 y/o female s/p left nephrectomy. Pt with significant amount of pain but is willing to mobilize with therapy. She requires increased assistance for bed mobility as compared to upright activity due to left flank incisional pain. Pt with good family support at home but because of her PLOF being so independent, PT to continue working with her in acute setting to address bed mobility and ambulation distances. Recommend DC home when able with a FWW for added support.   "

## 2019-04-30 NOTE — PROGRESS NOTES
"Urology Progress Note    Post op Day # 1    Overnight Events: None    S: No fevers, chills, +nausea  No vomiting. No flatus.  Tolerating CLD.  Marlow out    O:   /67   Pulse 88   Temp 37.3 °C (99.1 °F) (Temporal)   Resp 18   Ht 1.549 m (5' 1\")   Wt 71.3 kg (157 lb 3 oz)   SpO2 95%   Recent Labs      04/30/19   0257   SODIUM  139   POTASSIUM  3.8   CHLORIDE  103   CO2  25   GLUCOSE  229*   BUN  12   CREATININE  0.81   CALCIUM  9.3     Recent Labs      04/30/19   0257   WBC  10.6   RBC  4.64   HEMOGLOBIN  13.7   HEMATOCRIT  39.9   MCV  86.0   MCH  29.5   MCHC  34.3   RDW  36.9   PLATELETCT  237   MPV  10.4         Intake/Output Summary (Last 24 hours) at 04/30/19 1532  Last data filed at 04/30/19 1300   Gross per 24 hour   Intake             3265 ml   Output             3050 ml   Net              215 ml       Exam:  Abdomen soft, benign.  Incisions clean, dry, intact. Hypoactive bowel sounds.         A/P:  There are no active hospital problems to display for this patient.      Stable.     PLAN:  Ambulate, IS.  Repeat am labs  ADAT in am if remains stable with emesis overnight.   "

## 2019-04-30 NOTE — ANESTHESIA POSTPROCEDURE EVALUATION
Patient: Martha Reyes Martinez    Procedure Summary     Date:  04/29/19 Room / Location:  [place] [address] / SURGERY [facility]    Anesthesia Start:  1203 Anesthesia Stop:  1525    Procedure:  NEPHRECTOMY, LAPAROSCOPIC- SIMPLE (Left Abdomen) Diagnosis:  (CALCULUS OF KIDNEY, LEFT )    Surgeon:  Javier Perez M.D. Responsible Provider:  aSl Colin M.D.    Anesthesia Type:  general ASA Status:  2          Final Anesthesia Type: general  Last vitals  BP   Blood Pressure: 146/74, NIBP: 137/82    Temp   37.1 °C (98.8 °F)    Pulse   Pulse: 77, Heart Rate (Monitored): 90   Resp   16    SpO2   94 %      Anesthesia Post Evaluation    Patient location during evaluation: PACU  Patient participation: complete - patient participated  Level of consciousness: awake and alert  Pain score: 3    Airway patency: patent  Anesthetic complications: no  Cardiovascular status: hemodynamically stable  Respiratory status: acceptable  Hydration status: euvolemic    PONV: none           Nurse Pain Score: 3 (NPRS)

## 2019-04-30 NOTE — PROGRESS NOTES
Pt aox 4. No visible signs of distress. VSS.  Tolerating medication regimen without any signs or symptoms of adverse reactions.  Pt reports 7/10 pain, medicated per MAR.  Pt having nausea with emesis.  Abdominal surgical dressings are CDI.  On 2L of O2 via NC, no complaints of SOB.  Bed locked and in low position.  Call light within reach and able to make all needs be known.  Will continue to monitor.

## 2019-04-30 NOTE — PROGRESS NOTES
Removed blevins per order from Brianna KASPER over phone.   Patient tolerated well.   Patient up to ambulate and voided 200mL post blevins removal.

## 2019-04-30 NOTE — PROGRESS NOTES
Report received from night RN, assumed Care.   Patient is AOx4, responds appropriately.    Armenian speaking.  Family at bedside.  Would like to have family help, declined  line at this time.    Patient reports nausea and pain.  Medicated with IV for both due to toleration.  Ice pack also in use.  Patient is half of tolerating clear liquid diet, + nausea this AM, medicated per MAR. denies flatus -  Up with standby to 1x assist and FWW.  IS max pull 1000, encouraged use.  Marlow in place with stat lock, clear yellow urine present.   PIV fluids per order, did not saline lock due to nausea.     Plan of care discussed, all questions answered.    Explained importance of calling before getting OOB and pt verbalizes understanding.       Call light and belongings within reach, treaded slipper socks on, SCD in use, bed in lowest locked position.  Hourly rounding in place, all needs met at this time

## 2019-05-01 VITALS
WEIGHT: 157.19 LBS | HEART RATE: 78 BPM | BODY MASS INDEX: 29.68 KG/M2 | DIASTOLIC BLOOD PRESSURE: 70 MMHG | SYSTOLIC BLOOD PRESSURE: 125 MMHG | RESPIRATION RATE: 18 BRPM | TEMPERATURE: 98.7 F | HEIGHT: 61 IN | OXYGEN SATURATION: 93 %

## 2019-05-01 LAB
ALBUMIN SERPL BCP-MCNC: 3.7 G/DL (ref 3.2–4.9)
ALBUMIN/GLOB SERPL: 1.2 G/DL
ALP SERPL-CCNC: 66 U/L (ref 30–99)
ALT SERPL-CCNC: 35 U/L (ref 2–50)
ANION GAP SERPL CALC-SCNC: 9 MMOL/L (ref 0–11.9)
AST SERPL-CCNC: 26 U/L (ref 12–45)
BASOPHILS # BLD AUTO: 0.3 % (ref 0–1.8)
BASOPHILS # BLD: 0.03 K/UL (ref 0–0.12)
BILIRUB SERPL-MCNC: 1 MG/DL (ref 0.1–1.5)
BUN SERPL-MCNC: 13 MG/DL (ref 8–22)
CALCIUM SERPL-MCNC: 9.5 MG/DL (ref 8.5–10.5)
CHLORIDE SERPL-SCNC: 99 MMOL/L (ref 96–112)
CO2 SERPL-SCNC: 30 MMOL/L (ref 20–33)
CREAT SERPL-MCNC: 0.86 MG/DL (ref 0.5–1.4)
EOSINOPHIL # BLD AUTO: 0.14 K/UL (ref 0–0.51)
EOSINOPHIL NFR BLD: 1.3 % (ref 0–6.9)
ERYTHROCYTE [DISTWIDTH] IN BLOOD BY AUTOMATED COUNT: 39.1 FL (ref 35.9–50)
GLOBULIN SER CALC-MCNC: 3 G/DL (ref 1.9–3.5)
GLUCOSE SERPL-MCNC: 115 MG/DL (ref 65–99)
HCT VFR BLD AUTO: 38.4 % (ref 37–47)
HGB BLD-MCNC: 13 G/DL (ref 12–16)
IMM GRANULOCYTES # BLD AUTO: 0.04 K/UL (ref 0–0.11)
IMM GRANULOCYTES NFR BLD AUTO: 0.4 % (ref 0–0.9)
LYMPHOCYTES # BLD AUTO: 3.14 K/UL (ref 1–4.8)
LYMPHOCYTES NFR BLD: 29 % (ref 22–41)
MCH RBC QN AUTO: 29.7 PG (ref 27–33)
MCHC RBC AUTO-ENTMCNC: 33.9 G/DL (ref 33.6–35)
MCV RBC AUTO: 87.9 FL (ref 81.4–97.8)
MONOCYTES # BLD AUTO: 0.89 K/UL (ref 0–0.85)
MONOCYTES NFR BLD AUTO: 8.2 % (ref 0–13.4)
NEUTROPHILS # BLD AUTO: 6.58 K/UL (ref 2–7.15)
NEUTROPHILS NFR BLD: 60.8 % (ref 44–72)
NRBC # BLD AUTO: 0 K/UL
NRBC BLD-RTO: 0 /100 WBC
PLATELET # BLD AUTO: 229 K/UL (ref 164–446)
PMV BLD AUTO: 10.9 FL (ref 9–12.9)
POTASSIUM SERPL-SCNC: 3.7 MMOL/L (ref 3.6–5.5)
PROT SERPL-MCNC: 6.7 G/DL (ref 6–8.2)
RBC # BLD AUTO: 4.37 M/UL (ref 4.2–5.4)
SODIUM SERPL-SCNC: 138 MMOL/L (ref 135–145)
WBC # BLD AUTO: 10.8 K/UL (ref 4.8–10.8)

## 2019-05-01 PROCEDURE — 700102 HCHG RX REV CODE 250 W/ 637 OVERRIDE(OP): Performed by: UROLOGY

## 2019-05-01 PROCEDURE — 80053 COMPREHEN METABOLIC PANEL: CPT

## 2019-05-01 PROCEDURE — A9270 NON-COVERED ITEM OR SERVICE: HCPCS | Performed by: UROLOGY

## 2019-05-01 PROCEDURE — 85025 COMPLETE CBC W/AUTO DIFF WBC: CPT

## 2019-05-01 PROCEDURE — 97165 OT EVAL LOW COMPLEX 30 MIN: CPT

## 2019-05-01 PROCEDURE — 36415 COLL VENOUS BLD VENIPUNCTURE: CPT

## 2019-05-01 RX ORDER — DOCUSATE SODIUM 100 MG/1
100 CAPSULE, LIQUID FILLED ORAL 2 TIMES DAILY
Qty: 20 CAP | Refills: 0 | Status: SHIPPED | OUTPATIENT
Start: 2019-05-01

## 2019-05-01 RX ORDER — OXYCODONE HYDROCHLORIDE AND ACETAMINOPHEN 5; 325 MG/1; MG/1
1 TABLET ORAL EVERY 4 HOURS PRN
Qty: 15 TAB | Refills: 0 | Status: SHIPPED | OUTPATIENT
Start: 2019-05-01 | End: 2019-05-08

## 2019-05-01 RX ADMIN — LISINOPRIL 20 MG: 20 TABLET ORAL at 05:34

## 2019-05-01 RX ADMIN — POLYETHYLENE GLYCOL 3350 1 PACKET: 17 POWDER, FOR SOLUTION ORAL at 05:34

## 2019-05-01 RX ADMIN — OXYCODONE HYDROCHLORIDE AND ACETAMINOPHEN 2 TABLET: 5; 325 TABLET ORAL at 08:36

## 2019-05-01 RX ADMIN — OXYCODONE HYDROCHLORIDE AND ACETAMINOPHEN 1 TABLET: 5; 325 TABLET ORAL at 13:30

## 2019-05-01 RX ADMIN — HYDROCHLOROTHIAZIDE 25 MG: 25 TABLET ORAL at 05:34

## 2019-05-01 ASSESSMENT — COGNITIVE AND FUNCTIONAL STATUS - GENERAL
DRESSING REGULAR LOWER BODY CLOTHING: A LITTLE
DAILY ACTIVITIY SCORE: 23
SUGGESTED CMS G CODE MODIFIER DAILY ACTIVITY: CI

## 2019-05-01 ASSESSMENT — ACTIVITIES OF DAILY LIVING (ADL): TOILETING: INDEPENDENT

## 2019-05-01 NOTE — PROGRESS NOTES
Pt aox 4. No visible signs of distress. VSS.  Tolerating medication regimen without any signs or symptoms of adverse reactions.  Pt reports 8/10 pain, medicated per MAR.  Tolerating diet without any n/v. + BS, - BM  Ambulatory standby assist, steady gait.  Abdominal surgical dressings are CDI.  On room air, no complaints of SOB.  Bed locked and in low position.  Call light within reach and able to make all needs be known.  Will continue to monitor.

## 2019-05-01 NOTE — PROGRESS NOTES
Bedside report received.  Assessment complete.  A&O x 4. Patient calls appropriately.  Patient up with stand by assist.   Patient has 7/10 pain. Medicated per MAR.  Denies N&V. Tolerating diet.  Surgical incisions to LLQ abdomen, dressing in place, CDI.  + void, + flatus  Patient denies SOB.  SCD's in place.  Patient family at bedside.  Review plan with of care with patient. Call light and personal belongings with in reach. Hourly rounding in place. All needs met at this time.

## 2019-05-01 NOTE — THERAPY
"Occupational Therapy Evaluation completed.   Functional Status:  Up self in room, w/daughter and grandchildren. ambulating in room w/o AD, reports pain is improving. Able to complete most basic ADL's w/o assist. Some difficulty w/LB dressing however related to post op pain. Educated on adaptive strategies, encouraged to continue daily mobility and discussed home safety. Pt and family expressed no concerns regarding d/c.   Plan of Care: Patient with no further skilled OT needs in the acute care setting at this time  Discharge Recommendations:  Equipment: No Equipment Needed. Post-acute therapy Anticipate that the patient will have no further occupational therapy needs after discharge from the hospital.       See \"Rehab Therapy-Acute\" Patient Summary Report for complete documentation.      54 yr old female admitted for calculus of kidney, s/p left nephrectomy pt is demonstrating post op pain. Pt demonstrated ability to complete most ADL's and will have assist at d/c. Anticipate no further acute OT needs   "

## 2019-05-01 NOTE — PROGRESS NOTES
Patient discharged to home with family. All lines removed. Discharge instructions and prescription reviewed and understanding verbalized. Patient states they will fill prescriptions. Patient states they will return to emergency if discussed symptoms arise. Patient left via wheelchair and student nurse. Medications from drawer removed and sent back to pharmacy or disposed of per protocol. Chart given to unit clerk.

## 2019-05-01 NOTE — DISCHARGE SUMMARY
Discharge Summary    CHIEF COMPLAINT ON ADMISSION  No chief complaint on file.      Reason for Admission  CALCULUS OF KIDNEY      Admission Date  4/29/2019    CODE STATUS  Full Code    HPI & HOSPITAL COURSE  This is a 54 y.o. female here with a non functioning left kidney with left kidney stone.  She underwent Hand-assisted laparoscopic simple left nephrectomy on 4/29/19.  She experienced nausea without vomiting.  She denies CP, SOB, or CP.  POD2, she denies nausea, vomiting, fever or chills.  Ambulating to self.  Tolerating diet.  Marlow out and voiding without difficulty.        Therefore, she is discharged in good and stable condition to home with close outpatient follow-up.        Discharge Date  5/1/19    FOLLOW UP ITEMS POST DISCHARGE  Our office will contact her to arrange 10day post op visit and staple removal    DISCHARGE DIAGNOSES  Active Problems:    Pyelonephritis POA: Yes  Resolved Problems:    * No resolved hospital problems. *      FOLLOW UP  No future appointments.  No follow-up provider specified.    MEDICATIONS ON DISCHARGE     Medication List      START taking these medications      Instructions   docusate sodium 100 MG Caps  Commonly known as:  COLACE   Take 1 Cap by mouth 2 times a day.  Dose:  100 mg     oxyCODONE-acetaminophen 5-325 MG Tabs  Commonly known as:  PERCOCET   Take 1 Tab by mouth every four hours as needed for up to 7 days.  Dose:  1 Tab        ASK your doctor about these medications      Instructions   CALCIUM 600+D 600-200 MG-UNIT Tabs  Generic drug:  Calcium Carbonate-Vitamin D   Take 1 Tab by mouth every day.  Dose:  1 Tab     hydroCHLOROthiazide 25 MG Tabs  Commonly known as:  HYDRODIURIL   Take 25 mg by mouth every day.  Dose:  25 mg     lisinopril 20 MG Tabs  Commonly known as:  PRINIVIL   Take 20 mg by mouth every day.  Dose:  20 mg     OMEGA-3 FISH OIL PO   Take 1 Tab by mouth every day.  Dose:  1 Tab            Allergies  No Known Allergies    DIET  Orders Placed This  Encounter   Procedures   • Diet Order Full Liquid     Standing Status:   Standing     Number of Occurrences:   1     Order Specific Question:   Diet:     Answer:   Full Liquid [11]       ACTIVITY  No strenuous exercise      CONSULTATIONS  none    PROCEDURES  Hand-assisted laparoscopic simple left nephrectomy    LABORATORY  Lab Results   Component Value Date    SODIUM 138 05/01/2019    POTASSIUM 3.7 05/01/2019    CHLORIDE 99 05/01/2019    CO2 30 05/01/2019    GLUCOSE 115 (H) 05/01/2019    BUN 13 05/01/2019    CREATININE 0.86 05/01/2019        Lab Results   Component Value Date    WBC 10.8 05/01/2019    HEMOGLOBIN 13.0 05/01/2019    HEMATOCRIT 38.4 05/01/2019    PLATELETCT 229 05/01/2019        Total time of the discharge process exceeds 32 minutes.

## 2023-08-15 NOTE — DISCHARGE INSTRUCTIONS
Discharge Instructions    Discharged to home by car with relative. Discharged via wheelchair, hospital escort: Yes.  Special equipment needed: Not Applicable    Be sure to schedule a follow-up appointment with your primary care doctor or any specialists as instructed.     Discharge Plan:   Influenza Vaccine Indication: Not indicated: Previously immunized this influenza season and > 8 years of age    I understand that a diet low in cholesterol, fat, and sodium is recommended for good health. Unless I have been given specific instructions below for another diet, I accept this instruction as my diet prescription.   Other diet: Full liquid    Special Instructions:   Please take stool softeners while taking narcotics    · Is patient discharged on Warfarin / Coumadin?   No   Docusate capsules  ¿Qué es [place] medicamento?  El DOCUSATO es un ablandador fecal. Ayuda a prevenir el estreñimiento y los esfuerzos para defecar o las molestias asociadas con las heces duras o secas.  [place] medicamento puede ser utilizado para otros usos; si tiene alguna pregunta consulte con wilkins proveedor de atención médica o con wilkins farmacéutico.  MARCAS COMUNES: Colace, Colace Clear, Correctol, D.O.S., DC, Doc-Q-Lace, DocuLace, Docusoft S, DOK, DOK Extra Strength, Dulcolax, Genasoft, Nathanael-Tin, Kaopectate Liqui-Gels, Harvey Stool Softener, Stool Softener, Stool Softner DC, Sulfolax, Jori-Q-Lax, Surfak, Uni-Ease  ¿Qué le huber informar a mi profesional de la maribell antes de adriano [place] medicamento?  Necesita saber si usted presenta alguno de los siguientes problemas o situaciones:  -náuseas o vómito  -estreñimiento severo  -dolor de estómago  -cambio repentino en el hábito intestinal que dura más de 2 semanas  -yue reacción alérgica o inusual al docusato, otros medicamentos, alimentos, colorantes o conservantes  -si está embarazada o buscando quedar embarazada  -si está amamantando a un bebé  ¿Cómo huber utilizar [place] medicamento?  [place] medicamento por  vía oral con un vaso de agua. Siga las instrucciones de la etiqueta del medicamento. [place] alesia dosis a intervalos regulares. No tome wilkins medicamento con yue frecuencia mayor a la indicada.  Hable con wilkins pediatra para informarse acerca del uso de carmen medicamento en niños. Aunque carmen medicamento ha sido recetado a niños tan menores van de 2 años de edad para condiciones selectivas, las precauciones se aplican.  Sobredosis: Póngase en contacto inmediatamente con un centro toxicológico o yue gilberto de urgencia si usted allyson que haya tomado demasiado medicamento.  ATENCIÓN: Carmen medicamento es solo para usted. No comparta carmen medicamento con nadie.  ¿Qué sucede si me olvido de yue dosis?  Si olvida yue dosis, tómela lo antes posible. Si es catalina la hora de la próxima dosis, tome sólo pratik dosis. No tome dosis adicionales o dobles.  ¿Qué puede interactuar con carmen medicamento?  -aceite mineral  Puede ser que esta lista no menciona todas las posibles interacciones. Informe a wilkins profesional de la maribell de todos los productos a base de hierbas, medicamentos de venta melanie o suplementos nutritivos que esté tomando. Si usted fuma, consume bebidas alcohólicas o si utiliza drogas ilegales, indíqueselo también a wilkins profesional de la maribell. Algunas sustancias pueden interactuar con wilkins medicamento.  ¿A qué huber estar atento al usar carmen medicamento?  No lo utilice patel más de yue semana sin consultar a wilkins médico o a wilkins profesional de la maribell. Consulte a wilkins médico o a wilkins profesional de la maribell si el estreñimiento reaparece.  Sharon agua en abundancia mientras esté tomando carmen medicamento para ayudar a combatir el estreñimiento.  Deje de usar carmen medicamento y comuníquese con wilkins médico o wilkins profesional de la maribell si experimenta sangrado rectal o no evacua los intestinos después de usar. Éstos pueden ser síntomas de yue enfermedad más grave.  ¿Qué efectos secundarios puedo tener al utilizar carmen medicamento?  Efectos secundarios  que debe informar a wilkins médico o a wilkins profesional de la maribell tan pronto van sea posible:  -reacciones alérgicas van erupción cutánea, picazón o urticarias, hinchazón de la dacia, labios o lengua  Efectos secundarios que, por lo general, no requieren atención médica (debe informarlos a wilkins médico o a wilkins profesional de la maribell si persisten o si son molestos):  -diarrea  -calambres estomacales  -irritación de la garganta  Puede ser que esta lista no menciona todos los posibles efectos secundarios. Comuníquese a wilkins médico por asesoramiento médico sobre los efectos secundarios. Usted puede informar los efectos secundarios a la FDA por teléfono al [phone]-[phone].  ¿Dónde huber guardar mi medicina?  Manténgala fuera del alcance de los niños.  Guárdela a temperatura ambiente, entre 15 y 30 grados C (59 y 86 grados F). Deseche todo el medicamento que no haya utilizado, después de la fecha de vencimiento.  ATENCIÓN: Carmen folleto es un resumen. Puede ser que no cubra toda la posible información. Si usted tiene preguntas acerca de esta medicina, consulte con wilkins médico, wilkins farmacéutico o wilkins profesional de la maribell.  © 2018 Elsevier/Gold Standard (2016-02-09 00:00:00)    Nefrectomía laparoscópica  (Laparoscopic Nephrectomy)  La nefrectomía laparoscópica es un procedimiento quirúrgico que se lleva a cabo para extirpar un riñón. Carmen procedimiento se realiza a través de varias incisiones pequeñas en el abdomen. En carmen procedimiento, le pueden extirpar un riñón entero y algunas estructuras circundantes (nefrectomía radical), o solo la parte dañada o enferma del riñón (nefrectomía parcial).  Puede necesitar esta cirugía si tiene el riñón con un daño grave por enfermedad renal, infección o cáncer. También puede necesitar carmen procedimiento si nació con un riñón anormal o si chantal un riñón saludable.  INFORME A WILKINS MÉDICO:  · Cualquier alergia que tenga.  · Todos los medicamentos que utiliza, incluidos vitaminas, hierbas, gotas  oftálmicas, cremas y medicamentos de venta melanie.  · Problemas previos que usted o los miembros de wilkins tiffany hayan tenido con el uso de anestésicos.  · Enfermedades de la asif que tenga.  · Si tiene cirugías previas.  · Cualquier enfermedad que tenga.  · Si está embarazada o podría estarlo.  RIESGOS Y COMPLICACIONES  En general, se trata de un procedimiento seguro. Sin embargo, pueden presentarse problemas, por ejemplo:  · Hemorragia.  · Infección.  · Neumonía.  · Daño a otras estructuras orgánicas cerca del riñón.  · Reacciones alérgicas a los medicamentos.  Enrique vez haya que extirpar un hueso o tejido adicional para que el cirujano pueda tener acceso al riñón. Si esto ocurre, es posible que haya que cambiar el procedimiento laparoscópico por un procedimiento abierto.  ANTES DEL PROCEDIMIENTO  · Consulte a wilkins médico acerca de estos temas:  ¨ Cambiar o suspender los medicamentos que jomar habitualmente. [place] es muy importante si jomar medicamentos para la diabetes o anticoagulantes.  ¨ Lucio medicamentos, van aspirina e ibuprofeno. Estos medicamentos pueden tener un efecto anticoagulante en la asif. No tome estos medicamentos antes del procedimiento si el médico le indica que no lo sylwia.  · Siga las indicaciones del médico respecto de las restricciones para las comidas o las bebidas.  · Sylwia planes para que yue persona lo lleve de vuelta a wilkins casa después del procedimiento.  · No consuma productos que contengan tabaco, incluidos cigarrillos, tabaco de mascar o cigarrillos electrónicos. Si necesita ayuda para dejar de fumar, consulte al médico.  · Pregúntele al médico cómo se marcará o se identificará el lugar de la cirugía.  · Pueden darle antibióticos para ayudar a prevenir las infecciones.  PROCEDIMIENTO  · Para reducir el riesgo de infecciones:  ¨ El equipo médico se lavará o se desinfectará las trudi.  ¨ Le lavarán la piel con jabón.  · Le colocarán yue vía intravenosa (IV) en yue de las venas.  · Le  administrarán un medicamento que lo hará dormir (anestesia general).  · Le colocarán un tubo para drenar la orina (sonda urinaria).  · Le realizarán yue pequeña incisión en el abdomen para introducir un instrumento de observación ospina provisto de yue cámara (laparoscopio). [place] instrumento le permite al cirujano observar el riñón patel el procedimiento.  · Le harán más incisiones pequeñas para introducir los instrumentos quirúrgicos laparoscópicos.  · Si le realizan yue nefrectomía parcial, solo le extirparán la parte enferma del riñón.  · Si le realizan yue nefrectomía radical:  ¨ Se separarán y ligarán todos los vasos sanguíneos que están conectados al riñón.  ¨ Le extirparán parte del tubo que transporta la orina desde el riñón hasta la vejiga.  ¨ Le extirparán el riñón.  · Yue incisión puede ser levemente más chloe para extirpar el riñón.  · Se controlará el sangrado en la barbara de la cirugía.  · Las incisiones se cerrarán con puntos (suturas) o con otro tipo de cierre.  · Se colocará yue venda (vendaje) sobre la incisión.  [place] procedimiento puede variar según el médico y el hospital.  DESPUÉS DEL PROCEDIMIENTO  · Le controlarán con frecuencia la presión arterial, la frecuencia cardíaca, la frecuencia respiratoria y el nivel de oxígeno en la asif hasta que hayan desaparecido los efectos de los medicamentos administrados.  · Le dejarán puesta la vía intravenosa hasta que pueda beber líquidos por sí mismo. Le controlarán la producción de orina.  · Le retirarán la sonda urinaria.  · Lo alentarán a caminar lo más pronto posible.  · Le explicarán cómo hacer los ejercicios de respiración, por ejemplo, toser y respirar profundamente. Estos ayudarán a evitar la neumonía.  Esta información no tiene van fin reemplazar el consejo del médico. Asegúrese de hacerle al médico cualquier pregunta que tenga.  Document Released: 09/07/2016 Document Revised: 09/07/2016 Document Reviewed: 05/11/2016  Elsevier Interactive  Patient Education © 2017 Elsevier Inc.      Depression / Suicide Risk    As you are discharged from this [facility] facility, it is important to learn how to keep safe from harming yourself.    Recognize the warning signs:  · Abrupt changes in personality, positive or negative- including increase in energy   · Giving away possessions  · Change in eating patterns- significant weight changes-  positive or negative  · Change in sleeping patterns- unable to sleep or sleeping all the time   · Unwillingness or inability to communicate  · Depression  · Unusual sadness, discouragement and loneliness  · Talk of wanting to die  · Neglect of personal appearance   · Rebelliousness- reckless behavior  · Withdrawal from people/activities they love  · Confusion- inability to concentrate     If you or a loved one observes any of these behaviors or has concerns about self-harm, here's what you can do:  · Talk about it- your feelings and reasons for harming yourself  · Remove any means that you might use to hurt yourself (examples: pills, rope, extension cords, firearm)  · Get professional help from the community (Mental Health, Substance Abuse, psychological counseling)  · Do not be alone:Call your Safe Contact- someone whom you trust who will be there for you.  · Call your local CRISIS HOTLINE 256-4646 or 150-845-8365  · Call your local Children's Mobile Crisis Response Team [place] (472) 306-3756 or www.[employer]  · Call the toll free National Suicide Prevention Hotlines   · National Suicide Prevention Lifeline 573-790-BXGD (7177)  · National Hope Line Network 800-SUICIDE (056-2512)       normal...

## (undated) DEVICE — KIT ANESTHESIA W/CIRCUIT & 3/LT BAG W/FILTER (20EA/CA)

## (undated) DEVICE — NEEDLE NON-SAFETY HYPO 21 GA X 1 1/2 IN HYPO (100/BX)

## (undated) DEVICE — SET EXTENSION WITH 2 PORTS (48EA/CA) ***PART #2C8610 IS A SUBSTITUTE*****

## (undated) DEVICE — SUTURE 3-0 VICRYL PLUS SH - 27 INCH (36/BX)

## (undated) DEVICE — DRAPESURG STERI-DRAPE LONG - (10/BX 4BX/CA)

## (undated) DEVICE — SENSOR SPO2 NEO LNCS ADHESIVE (20/BX) SEE USER NOTES

## (undated) DEVICE — WATER IRRIGATION STERILE 1000ML (12EA/CA)

## (undated) DEVICE — ENDO SHEARS LONG - (6EA/CA)

## (undated) DEVICE — MASK ANESTHESIA ADULT  - (100/CA)

## (undated) DEVICE — PACK LAP CHOLE OR - (2EA/CA)

## (undated) DEVICE — SUCTION INSTRUMENT YANKAUER BULBOUS TIP W/O VENT (50EA/CA)

## (undated) DEVICE — BLADE SURGICAL #15 - (50/BX 3BX/CA)

## (undated) DEVICE — PROTECTOR ULNA NERVE - (36PR/CA)

## (undated) DEVICE — CLIP HEM-O-LOC GREEN - (14EA/BX)

## (undated) DEVICE — SUTURE 1 PDS-2 PLUS CTX - (24/BX)

## (undated) DEVICE — GLOVE BIOGEL PI ORTHO SZ 7 PF LF (40PR/BX)

## (undated) DEVICE — SPONGE GAUZESTER 4 X 4 4PLY - (128PK/CA)

## (undated) DEVICE — NEPTUNE 4 PORT MANIFOLD - (20/PK)

## (undated) DEVICE — TOWELS CLOTH SURGICAL - (4/PK 20PK/CA)

## (undated) DEVICE — SYRINGE 30 ML LL (56/BX)

## (undated) DEVICE — STAPLE 45MM VASCULAR WHITE 2.5MM (12EA/BX)

## (undated) DEVICE — CANNULA W/SEAL12X100ZTHREAD - (12/BX)

## (undated) DEVICE — NEEDLE INSFL 120MM 14GA VRRS - (20/BX)

## (undated) DEVICE — STAPLER SKIN DISP - (6/BX 10BX/CA) VISISTAT

## (undated) DEVICE — SHEET TRANSVERSE LAP - (12EA/CA)

## (undated) DEVICE — TRAY CATHETER FOLEY URINE METER W/STATLOCK 350ML (10EA/CA)

## (undated) DEVICE — GLOVE BIOGEL SZ 7.5 SURGICAL PF LTX - (50PR/BX 4BX/CA)

## (undated) DEVICE — TUBING CLEARLINK DUO-VENT - C-FLO (48EA/CA)

## (undated) DEVICE — LACTATED RINGERS INJ 1000 ML - (14EA/CA 60CA/PF)

## (undated) DEVICE — PAD LAP STERILE 18 X 18 - (5/PK 40PK/CA)

## (undated) DEVICE — ELECTRODE 850 FOAM ADHESIVE - HYDROGEL RADIOTRNSPRNT (50/PK)

## (undated) DEVICE — KIT RADIAL ARTERY 20GA W/MAX BARRIER AND BIOPATCH  (5EA/CA) #10740 IS FOR THE SET RADIAL ARTERIAL

## (undated) DEVICE — TUBE E-T HI-LO CUFF 7.0MM (10EA/PK)

## (undated) DEVICE — GOWN SURGEONS X-LARGE - DISP. (30/CA)

## (undated) DEVICE — CANNULA W/SEAL 5X100 Z-THRE - ADED KII (12/BX)

## (undated) DEVICE — SUTURE GENERAL

## (undated) DEVICE — SODIUM CHL. INJ. 0.9% 500ML (24EA/CA 50CA/PF)

## (undated) DEVICE — HEAD HOLDER JUNIOR/ADULT

## (undated) DEVICE — DRAPE IOBAN II INCISE 23X17 - (10EA/BX 4BX/CA)

## (undated) DEVICE — STAPLER 45MM ARTICULATING - ENDO (3EA/BX)

## (undated) DEVICE — GELPORT 120MM - (1/EA)

## (undated) DEVICE — SLEEVE, VASO, THIGH, MED

## (undated) DEVICE — SUTURE 4-0 MONOCRYL PLUS PS-1 - 27 INCH (36/BX)

## (undated) DEVICE — JELLY SURGILUBE STERILE TUBE 4.25 OZ (1/EA)

## (undated) DEVICE — TROCAR 5X100 NON BLADED Z-TH - READ KII (6/BX)

## (undated) DEVICE — GOWN WARMING STANDARD FLEX - (30/CA)

## (undated) DEVICE — GLOVE BIOGEL PI INDICATOR SZ 6.5 SURGICAL PF LF - (50/BX 4BX/CA)

## (undated) DEVICE — SET LEADWIRE 5 LEAD BEDSIDE DISPOSABLE ECG (1SET OF 5/EA)

## (undated) DEVICE — APPLIER 5MM MED/LARGE CLIP - (3/BX)

## (undated) DEVICE — SPONGE XRAY 8X4 STERL. 12PL - (10EA/TY 80TY/CA)

## (undated) DEVICE — CHLORAPREP 26 ML APPLICATOR - ORANGE TINT(25/CA)

## (undated) DEVICE — CLIP HEMOLOCK PURPLE - (14/BX)

## (undated) DEVICE — ELECTRODE DUAL RETURN W/ CORD - (50/PK)

## (undated) DEVICE — KIT ROOM DECONTAMINATION

## (undated) DEVICE — CANISTER SUCTION 3000ML MECHANICAL FILTER AUTO SHUTOFF MEDI-VAC NONSTERILE LF DISP  (40EA/CA)

## (undated) DEVICE — SET SUCTION/IRRIGATION WITH DISPOSABLE TIP (6/CA )PART #0250-070-520 IS A SUB